# Patient Record
Sex: MALE | Race: WHITE | NOT HISPANIC OR LATINO | URBAN - METROPOLITAN AREA
[De-identification: names, ages, dates, MRNs, and addresses within clinical notes are randomized per-mention and may not be internally consistent; named-entity substitution may affect disease eponyms.]

---

## 2017-10-25 ENCOUNTER — EMERGENCY (EMERGENCY)
Facility: HOSPITAL | Age: 72
LOS: 0 days | Discharge: HOME | End: 2017-10-25

## 2017-10-25 DIAGNOSIS — R10.9 UNSPECIFIED ABDOMINAL PAIN: ICD-10-CM

## 2017-10-25 DIAGNOSIS — Z79.899 OTHER LONG TERM (CURRENT) DRUG THERAPY: ICD-10-CM

## 2017-10-25 DIAGNOSIS — R63.8 OTHER SYMPTOMS AND SIGNS CONCERNING FOOD AND FLUID INTAKE: ICD-10-CM

## 2017-10-25 DIAGNOSIS — Z87.442 PERSONAL HISTORY OF URINARY CALCULI: ICD-10-CM

## 2017-10-25 DIAGNOSIS — E78.00 PURE HYPERCHOLESTEROLEMIA, UNSPECIFIED: ICD-10-CM

## 2017-10-25 DIAGNOSIS — F32.9 MAJOR DEPRESSIVE DISORDER, SINGLE EPISODE, UNSPECIFIED: ICD-10-CM

## 2017-10-25 DIAGNOSIS — I10 ESSENTIAL (PRIMARY) HYPERTENSION: ICD-10-CM

## 2019-05-01 PROBLEM — Z00.00 ENCOUNTER FOR PREVENTIVE HEALTH EXAMINATION: Status: ACTIVE | Noted: 2019-05-01

## 2019-05-06 ENCOUNTER — APPOINTMENT (OUTPATIENT)
Dept: SURGERY | Facility: CLINIC | Age: 74
End: 2019-05-06
Payer: MEDICARE

## 2019-05-06 VITALS
DIASTOLIC BLOOD PRESSURE: 84 MMHG | BODY MASS INDEX: 28.17 KG/M2 | SYSTOLIC BLOOD PRESSURE: 142 MMHG | WEIGHT: 208 LBS | HEIGHT: 72 IN

## 2019-05-06 DIAGNOSIS — Z78.9 OTHER SPECIFIED HEALTH STATUS: ICD-10-CM

## 2019-05-06 PROCEDURE — 99202 OFFICE O/P NEW SF 15 MIN: CPT

## 2019-05-07 PROBLEM — Z78.9 CAFFEINE USE: Status: ACTIVE | Noted: 2019-05-06

## 2019-05-07 PROBLEM — Z78.9 SOCIAL ALCOHOL USE: Status: ACTIVE | Noted: 2019-05-06

## 2019-05-07 RX ORDER — OLMESARTAN MEDOXOMIL 40 MG/1
TABLET, FILM COATED ORAL
Refills: 0 | Status: ACTIVE | COMMUNITY

## 2019-05-07 NOTE — HISTORY OF PRESENT ILLNESS
[de-identified] : The patient presents for excision of a cyst in his right lower leg, present for at least 6 months and slowly enlarging. It is asymptomatic and there is no history of local trauma or infection.

## 2019-05-07 NOTE — PHYSICAL EXAM
[Normal Thyroid] : the thyroid was normal [Normal Breath Sounds] : Normal breath sounds [Normal Heart Sounds] : normal heart sounds [Normal Rate and Rhythm] : normal rate and rhythm [de-identified] : One CM raised intradermal lesion on the medial aspect of the right pretibial area, somewhat cystic in character but without any drainage or local acute changes. This is not tender. [de-identified] : no adenopathy

## 2019-05-09 ENCOUNTER — FORM ENCOUNTER (OUTPATIENT)
Age: 74
End: 2019-05-09

## 2019-05-10 ENCOUNTER — OUTPATIENT (OUTPATIENT)
Dept: OUTPATIENT SERVICES | Facility: HOSPITAL | Age: 74
LOS: 1 days | Discharge: HOME | End: 2019-05-10
Payer: MEDICARE

## 2019-05-10 VITALS
RESPIRATION RATE: 15 BRPM | DIASTOLIC BLOOD PRESSURE: 83 MMHG | TEMPERATURE: 98 F | HEART RATE: 88 BPM | WEIGHT: 212.53 LBS | SYSTOLIC BLOOD PRESSURE: 158 MMHG | OXYGEN SATURATION: 100 % | HEIGHT: 73 IN

## 2019-05-10 DIAGNOSIS — Z01.818 ENCOUNTER FOR OTHER PREPROCEDURAL EXAMINATION: ICD-10-CM

## 2019-05-10 DIAGNOSIS — L72.0 EPIDERMAL CYST: ICD-10-CM

## 2019-05-10 DIAGNOSIS — Z98.890 OTHER SPECIFIED POSTPROCEDURAL STATES: Chronic | ICD-10-CM

## 2019-05-10 LAB
ALBUMIN SERPL ELPH-MCNC: 4.3 G/DL — SIGNIFICANT CHANGE UP (ref 3.5–5.2)
ALP SERPL-CCNC: 77 U/L — SIGNIFICANT CHANGE UP (ref 30–115)
ALT FLD-CCNC: 36 U/L — SIGNIFICANT CHANGE UP (ref 0–41)
ANION GAP SERPL CALC-SCNC: 10 MMOL/L — SIGNIFICANT CHANGE UP (ref 7–14)
APTT BLD: 28.5 SEC — SIGNIFICANT CHANGE UP (ref 27–39.2)
AST SERPL-CCNC: 33 U/L — SIGNIFICANT CHANGE UP (ref 0–41)
BILIRUB SERPL-MCNC: 0.4 MG/DL — SIGNIFICANT CHANGE UP (ref 0.2–1.2)
BUN SERPL-MCNC: 30 MG/DL — HIGH (ref 10–20)
CALCIUM SERPL-MCNC: 9.3 MG/DL — SIGNIFICANT CHANGE UP (ref 8.5–10.1)
CHLORIDE SERPL-SCNC: 105 MMOL/L — SIGNIFICANT CHANGE UP (ref 98–110)
CO2 SERPL-SCNC: 26 MMOL/L — SIGNIFICANT CHANGE UP (ref 17–32)
CREAT SERPL-MCNC: 1.2 MG/DL — SIGNIFICANT CHANGE UP (ref 0.7–1.5)
GLUCOSE SERPL-MCNC: 93 MG/DL — SIGNIFICANT CHANGE UP (ref 70–99)
HCT VFR BLD CALC: 48.6 % — SIGNIFICANT CHANGE UP (ref 42–52)
HGB BLD-MCNC: 16.1 G/DL — SIGNIFICANT CHANGE UP (ref 14–18)
INR BLD: 0.98 RATIO — SIGNIFICANT CHANGE UP (ref 0.65–1.3)
MCHC RBC-ENTMCNC: 31.5 PG — HIGH (ref 27–31)
MCHC RBC-ENTMCNC: 33.1 G/DL — SIGNIFICANT CHANGE UP (ref 32–37)
MCV RBC AUTO: 95.1 FL — HIGH (ref 80–94)
NRBC # BLD: 0 /100 WBCS — SIGNIFICANT CHANGE UP (ref 0–0)
PLATELET # BLD AUTO: 154 K/UL — SIGNIFICANT CHANGE UP (ref 130–400)
POTASSIUM SERPL-MCNC: 4.6 MMOL/L — SIGNIFICANT CHANGE UP (ref 3.5–5)
POTASSIUM SERPL-SCNC: 4.6 MMOL/L — SIGNIFICANT CHANGE UP (ref 3.5–5)
PROT SERPL-MCNC: 6.9 G/DL — SIGNIFICANT CHANGE UP (ref 6–8)
PROTHROM AB SERPL-ACNC: 11.3 SEC — SIGNIFICANT CHANGE UP (ref 9.95–12.87)
RBC # BLD: 5.11 M/UL — SIGNIFICANT CHANGE UP (ref 4.7–6.1)
RBC # FLD: 13.9 % — SIGNIFICANT CHANGE UP (ref 11.5–14.5)
SODIUM SERPL-SCNC: 141 MMOL/L — SIGNIFICANT CHANGE UP (ref 135–146)
WBC # BLD: 6.28 K/UL — SIGNIFICANT CHANGE UP (ref 4.8–10.8)
WBC # FLD AUTO: 6.28 K/UL — SIGNIFICANT CHANGE UP (ref 4.8–10.8)

## 2019-05-10 PROCEDURE — 93010 ELECTROCARDIOGRAM REPORT: CPT

## 2019-05-10 PROCEDURE — 71046 X-RAY EXAM CHEST 2 VIEWS: CPT | Mod: 26

## 2019-05-10 NOTE — H&P PST ADULT - HISTORY OF PRESENT ILLNESS
73 year old male here for right leg cyst removal had for about 1 year and it has been growing  fos-4  denies chest pain sob palp  denies recent uri or uti

## 2019-05-10 NOTE — H&P PST ADULT - NSICDXPASTMEDICALHX_GEN_ALL_CORE_FT
PAST MEDICAL HISTORY:  BPH (benign prostatic hyperplasia)     Depression     High cholesterol     History of tonsillectomy     HTN (hypertension)     Kidney stones

## 2019-05-10 NOTE — H&P PST ADULT - NSANTHOSAYNRD_GEN_A_CORE
No. LORRIE screening performed.  STOP BANG Legend: 0-2 = LOW Risk; 3-4 = INTERMEDIATE Risk; 5-8 = HIGH Risk/denies see lorrie tool

## 2019-05-16 NOTE — ASU PATIENT PROFILE, ADULT - PMH
BPH (benign prostatic hyperplasia)    Depression    High cholesterol    History of tonsillectomy    HTN (hypertension)    Kidney stones

## 2019-05-17 ENCOUNTER — OUTPATIENT (OUTPATIENT)
Dept: OUTPATIENT SERVICES | Facility: HOSPITAL | Age: 74
LOS: 1 days | Discharge: HOME | End: 2019-05-17
Payer: MEDICARE

## 2019-05-17 ENCOUNTER — APPOINTMENT (OUTPATIENT)
Dept: SURGERY | Facility: HOSPITAL | Age: 74
End: 2019-05-17

## 2019-05-17 ENCOUNTER — RESULT REVIEW (OUTPATIENT)
Age: 74
End: 2019-05-17

## 2019-05-17 VITALS — HEART RATE: 70 BPM | DIASTOLIC BLOOD PRESSURE: 72 MMHG | RESPIRATION RATE: 18 BRPM | SYSTOLIC BLOOD PRESSURE: 121 MMHG

## 2019-05-17 VITALS
WEIGHT: 210.1 LBS | RESPIRATION RATE: 17 BRPM | SYSTOLIC BLOOD PRESSURE: 148 MMHG | HEART RATE: 70 BPM | TEMPERATURE: 97 F | OXYGEN SATURATION: 96 % | DIASTOLIC BLOOD PRESSURE: 94 MMHG | HEIGHT: 74 IN

## 2019-05-17 DIAGNOSIS — Z98.890 OTHER SPECIFIED POSTPROCEDURAL STATES: Chronic | ICD-10-CM

## 2019-05-17 PROCEDURE — 88304 TISSUE EXAM BY PATHOLOGIST: CPT | Mod: 26

## 2019-05-17 PROCEDURE — 12032 INTMD RPR S/A/T/EXT 2.6-7.5: CPT

## 2019-05-17 PROCEDURE — 11402 EXC TR-EXT B9+MARG 1.1-2 CM: CPT

## 2019-05-17 RX ORDER — HYDROMORPHONE HYDROCHLORIDE 2 MG/ML
0.5 INJECTION INTRAMUSCULAR; INTRAVENOUS; SUBCUTANEOUS
Refills: 0 | Status: DISCONTINUED | OUTPATIENT
Start: 2019-05-17 | End: 2019-05-20

## 2019-05-17 RX ORDER — ONDANSETRON 8 MG/1
4 TABLET, FILM COATED ORAL ONCE
Refills: 0 | Status: DISCONTINUED | OUTPATIENT
Start: 2019-05-17 | End: 2019-05-20

## 2019-05-17 RX ORDER — SODIUM CHLORIDE 9 MG/ML
1000 INJECTION, SOLUTION INTRAVENOUS
Refills: 0 | Status: DISCONTINUED | OUTPATIENT
Start: 2019-05-17 | End: 2019-05-20

## 2019-05-17 RX ORDER — DOCUSATE SODIUM 100 MG
1 CAPSULE ORAL
Qty: 30 | Refills: 0
Start: 2019-05-17

## 2019-05-17 RX ORDER — ACETAMINOPHEN WITH CODEINE 300MG-30MG
1 TABLET ORAL
Qty: 12 | Refills: 0
Start: 2019-05-17

## 2019-05-17 RX ADMIN — SODIUM CHLORIDE 100 MILLILITER(S): 9 INJECTION, SOLUTION INTRAVENOUS at 13:30

## 2019-05-17 NOTE — ASU DISCHARGE PLAN (ADULT/PEDIATRIC) - CALL YOUR DOCTOR IF YOU HAVE ANY OF THE FOLLOWING:
Wound/Surgical Site with redness, or foul smelling discharge or pus/Unable to urinate/Pain not relieved by Medications/Bleeding that does not stop/Inability to tolerate liquids or foods/Fever greater than (need to indicate Fahrenheit or Celsius)/Swelling that gets worse

## 2019-05-17 NOTE — BRIEF OPERATIVE NOTE - OPERATION/FINDINGS
Right lower extremity anterior low leg lesion excised in its entirety, 3cm incision, closed with sutures. Hemostasis achieved

## 2019-05-17 NOTE — ASU DISCHARGE PLAN (ADULT/PEDIATRIC) - CARE PROVIDER_API CALL
Juanjo Jackson (MD)  Surgery  [address] [place] [address], [address] [place] [address]  Phone: [phone]  Fax: [phone]  Follow Up Time:

## 2019-05-17 NOTE — BRIEF OPERATIVE NOTE - NSICDXBRIEFPROCEDURE_GEN_ALL_CORE_FT
PROCEDURES:  Excision of lesion of right lower leg 17-May-2019 13:24:02 anterior lower leg Antoinette Smith

## 2019-05-17 NOTE — BRIEF OPERATIVE NOTE - NSICDXBRIEFPREOP_GEN_ALL_CORE_FT
PRE-OP DIAGNOSIS:  Skin lesion 17-May-2019 13:24:21 suspected epidermal inclusion cyst vs dermatofibroma Antoinette Smith

## 2019-05-17 NOTE — ASU DISCHARGE PLAN (ADULT/PEDIATRIC) - ASU DC SPECIAL INSTRUCTIONSFT
You may shower starting in 48 hours- let the soapy water run over your dressings which should be waterproof.  You should remove your outer dressing of clear sticky tape and gauze in 48 hours. You will see there are white tape strips underneath the gauze and these will stay on until they fall off by themselves in 1-2 weeks- do not pull them.  You may pat dry, do not rub.  No tubs or soaking.     You may eat today.   Avoid trauma to the area and avoid prolonged standing and walking. Keep your right leg elevated. You should expect some swelling.     Follow up with Dr. Jackson as scheduled.   Call the office with any questions or concerns and if they are not available, please seek medical attention as needed.       You can restart all your medications.   To control your pain, take acetaminophen (Tylenol) as directed on the bottle. Take this on a schedule, and as your pain gets better start taking it less often until you no longer need them. A prescription was called in to your pharmacy for a pain medication called Tylenol #3. It is a combination of acetaminophen (Tylenol) and codeine. You should take this as needed for pain not controlled by Tylenol alone.  You cannot drive while taking this medication.  Follow the directions on the bottle and given to you by your pharmacist. You may also take a stool softener like Colace (docusate) while you are taking this pain medicine as it may cause constipation.   You can switch to taking only Tylenol or other over the counter pain medications as your pain improves.  Make sure the total amount of acetaminophen (Tylenol) you take is less than 3500mg total a day- remember to add up the amount in the Tylenol #3 and any Tylenol you may take.

## 2019-05-17 NOTE — BRIEF OPERATIVE NOTE - NSICDXBRIEFPOSTOP_GEN_ALL_CORE_FT
POST-OP DIAGNOSIS:  Skin lesion 17-May-2019 13:24:42 suspected epidermal inclusion cyst vs dermatofibroma Antoinette Smith

## 2019-05-20 LAB — SURGICAL PATHOLOGY STUDY: SIGNIFICANT CHANGE UP

## 2019-05-21 PROBLEM — E78.00 PURE HYPERCHOLESTEROLEMIA, UNSPECIFIED: Chronic | Status: ACTIVE | Noted: 2019-05-10

## 2019-05-21 PROBLEM — F32.9 MAJOR DEPRESSIVE DISORDER, SINGLE EPISODE, UNSPECIFIED: Chronic | Status: ACTIVE | Noted: 2019-05-10

## 2019-05-21 PROBLEM — N20.0 CALCULUS OF KIDNEY: Chronic | Status: ACTIVE | Noted: 2019-05-10

## 2019-05-21 PROBLEM — N40.0 BENIGN PROSTATIC HYPERPLASIA WITHOUT LOWER URINARY TRACT SYMPTOMS: Chronic | Status: ACTIVE | Noted: 2019-05-10

## 2019-05-21 PROBLEM — I10 ESSENTIAL (PRIMARY) HYPERTENSION: Chronic | Status: ACTIVE | Noted: 2019-05-10

## 2019-05-21 PROBLEM — Z90.89 ACQUIRED ABSENCE OF OTHER ORGANS: Chronic | Status: ACTIVE | Noted: 2019-05-10

## 2019-05-22 DIAGNOSIS — L72.0 EPIDERMAL CYST: ICD-10-CM

## 2019-05-22 DIAGNOSIS — E78.00 PURE HYPERCHOLESTEROLEMIA, UNSPECIFIED: ICD-10-CM

## 2019-05-22 DIAGNOSIS — I10 ESSENTIAL (PRIMARY) HYPERTENSION: ICD-10-CM

## 2019-05-30 ENCOUNTER — APPOINTMENT (OUTPATIENT)
Dept: SURGERY | Facility: CLINIC | Age: 74
End: 2019-05-30
Payer: MEDICARE

## 2019-05-30 ENCOUNTER — OTHER (OUTPATIENT)
Age: 74
End: 2019-05-30

## 2019-05-30 VITALS
WEIGHT: 210 LBS | DIASTOLIC BLOOD PRESSURE: 78 MMHG | HEIGHT: 72 IN | BODY MASS INDEX: 28.44 KG/M2 | SYSTOLIC BLOOD PRESSURE: 126 MMHG

## 2019-05-30 DIAGNOSIS — L72.0 EPIDERMAL CYST: ICD-10-CM

## 2019-05-30 PROCEDURE — 99024 POSTOP FOLLOW-UP VISIT: CPT

## 2019-05-30 RX ORDER — TAMSULOSIN HYDROCHLORIDE 0.4 MG/1
0.4 CAPSULE ORAL
Qty: 90 | Refills: 0 | Status: ACTIVE | COMMUNITY
Start: 2018-10-08

## 2019-05-30 RX ORDER — POTASSIUM CITRATE 10 MEQ/1
10 MEQ TABLET, EXTENDED RELEASE ORAL
Qty: 200 | Refills: 0 | Status: ACTIVE | COMMUNITY
Start: 2018-08-11

## 2019-05-30 RX ORDER — ROSUVASTATIN CALCIUM 10 MG/1
10 TABLET, FILM COATED ORAL
Qty: 90 | Refills: 0 | Status: ACTIVE | COMMUNITY
Start: 2018-02-20

## 2019-05-30 RX ORDER — VILAZODONE HYDROCHLORIDE 40 MG/1
40 TABLET ORAL
Qty: 5 | Refills: 0 | Status: ACTIVE | COMMUNITY
Start: 2019-05-12

## 2019-05-30 RX ORDER — AMLODIPINE BESYLATE 5 MG/1
5 TABLET ORAL
Qty: 30 | Refills: 0 | Status: ACTIVE | COMMUNITY
Start: 2019-05-13

## 2019-05-30 RX ORDER — FINASTERIDE 5 MG/1
5 TABLET, FILM COATED ORAL
Qty: 90 | Refills: 0 | Status: ACTIVE | COMMUNITY
Start: 2018-06-04

## 2019-05-30 NOTE — ASSESSMENT
[FreeTextEntry1] : No p.o. problems noted.\par Local care and activity reviewed, no further Rx needed.

## 2020-05-22 ENCOUNTER — OUTPATIENT (OUTPATIENT)
Dept: OUTPATIENT SERVICES | Facility: HOSPITAL | Age: 75
LOS: 1 days | Discharge: HOME | End: 2020-05-22
Payer: MEDICARE

## 2020-05-22 DIAGNOSIS — M79.662 PAIN IN LEFT LOWER LEG: ICD-10-CM

## 2020-05-22 DIAGNOSIS — Z98.890 OTHER SPECIFIED POSTPROCEDURAL STATES: Chronic | ICD-10-CM

## 2020-05-22 PROCEDURE — 73718 MRI LOWER EXTREMITY W/O DYE: CPT | Mod: 26,LT

## 2020-09-16 ENCOUNTER — APPOINTMENT (OUTPATIENT)
Dept: ORTHOPEDIC SURGERY | Facility: CLINIC | Age: 75
End: 2020-09-16
Payer: MEDICARE

## 2020-09-16 VITALS — TEMPERATURE: 98 F

## 2020-09-16 DIAGNOSIS — M25.551 PAIN IN RIGHT HIP: ICD-10-CM

## 2020-09-16 DIAGNOSIS — M25.561 PAIN IN RIGHT KNEE: ICD-10-CM

## 2020-09-16 PROCEDURE — 73562 X-RAY EXAM OF KNEE 3: CPT | Mod: RT

## 2020-09-16 PROCEDURE — 99203 OFFICE O/P NEW LOW 30 MIN: CPT

## 2020-09-16 PROCEDURE — 73502 X-RAY EXAM HIP UNI 2-3 VIEWS: CPT | Mod: RT

## 2020-09-16 RX ORDER — ACETAMINOPHEN AND CODEINE 300; 30 MG/1; MG/1
300-30 TABLET ORAL
Qty: 12 | Refills: 0 | Status: DISCONTINUED | COMMUNITY
Start: 2019-05-17 | End: 2020-09-16

## 2020-09-16 RX ORDER — AZITHROMYCIN 250 MG/1
250 TABLET, FILM COATED ORAL
Qty: 6 | Refills: 0 | Status: DISCONTINUED | COMMUNITY
Start: 2019-01-02 | End: 2020-09-16

## 2020-09-16 RX ORDER — BENZOCAINE 20 G/100G
100 GEL, DENTIFRICE ORAL
Qty: 30 | Refills: 0 | Status: DISCONTINUED | COMMUNITY
Start: 2019-05-17 | End: 2020-09-16

## 2020-09-16 RX ORDER — DICLOFENAC SODIUM 75 MG/1
75 TABLET, DELAYED RELEASE ORAL
Qty: 14 | Refills: 0 | Status: DISCONTINUED | COMMUNITY
Start: 2019-05-13 | End: 2020-09-16

## 2020-09-16 RX ORDER — GUAIFENESIN AND CODEINE PHOSPHATE 10; 100 MG/5ML; MG/5ML
100-10 LIQUID ORAL
Qty: 150 | Refills: 0 | Status: DISCONTINUED | COMMUNITY
Start: 2019-01-02 | End: 2020-09-16

## 2020-09-16 RX ORDER — OFLOXACIN 3 MG/ML
0.3 SOLUTION/ DROPS OPHTHALMIC
Qty: 5 | Refills: 0 | Status: DISCONTINUED | COMMUNITY
Start: 2019-01-02 | End: 2020-09-16

## 2020-09-16 NOTE — PHYSICAL EXAM
[de-identified] : General appearance: well nourished and hydrated, pleasant, alert and oriented x 3, cooperative.\par Cardiovascular: no apparent abnormalities, no lower leg edema, no varicosities, pedal pulses are palpable.\par Neurologic: sensation is normal, no muscle weakness in upper or lower extremities\par Dermatologic no apparent skin lesions, moist, warm, no rash.\par Gait: nonantalgic.\par \par Left knee\par Inspection: no effusion or erythema.\par Wounds: none.\par Alignment: normal.\par Palpation: no specific tenderness on palpation.\par ROM: 0-125 degrees \par Ligamentous laxity: all ligaments appear stable\par Muscle Test: good quad strength.\par \par Right knee\par Inspection: small effusion or erythema.\par Wounds: none.\par Alignment: slightly valgus\par Palpation: lateral joint line tenderness \par ROM: 0-125 degrees \par Ligamentous laxity: all ligaments appear stable\par Muscle Test: good quad strength.\par \par Left hip\par Inspection: No swelling or ecchymosis.\par Wounds: none.\par Palpation: non-tender.\par Stability: no instability.\par Strength: 5/5 all motor groups.\par ROM: no pain with FROM.\par Leg length: equal.\par \par Right hip\par Inspection: No swelling or ecchymosis.\par Wounds: none.\par Palpation: non-tender.\par Stability: no instability.\par Strength: 5/5 all motor groups.\par ROM: no pain with FROM.\par Leg length: equal.\par \par   [de-identified] : Radiographs done today AP lateral and skyline of the right knee shows mild lateral joint space narrowing in the right knee. Well maintained joint spaces in the left knee. \par \par Radiographs done today AP pelvis and lateral of the right hip shows well maintained joint spaces

## 2020-09-16 NOTE — HISTORY OF PRESENT ILLNESS
[Pain Location] : pain [] : right knee [Worsening] : worsening [Standing] : standing [Walking] : walking [5] : a maximum pain level of 5/10 [Daily] : ~He/She~ states the symptoms seem to be occuring daily [de-identified] : 74 year old male presents to the office today complaining of right knee pain x2-3 months. Patient reports pain that is sharp in nature. He reports an overall feeling of weakness in the right lower extremity. Patient reports buckling and a loss of motion. He reports ambulating about 5 miles 4x per week and states that is him doing less than usual due to the pain he has been feeling. Patient reports pain and difficulty with negotiating stairs. Patient reports pain with putting socks and shoes on on his right side. Patient reports taking Advil for pain with good relief. He has an arthroscopy for a torn meniscus but is unsure which knee. Patient is here today to discuss his options for pain relief.

## 2020-09-16 NOTE — ASSESSMENT
[FreeTextEntry1] : 74 year old male presents for 2-3 month history of right knee pain. He describes a feeling of weakness in the right lower extremity. His pain level is a 5/10. He is able to walk 5 miles, 4x a weak. He has pain and difficulty on stairs, difficulty putting on shoes and socks. He had an arthroscopy for a meniscal tear in the past, but is unsure which knee he had it on. He takes Advil with good relief. Radiographs show lateral joint space narrowing in the right knee. Plan is to order an MRI of the right knee to rule out a torn lateral meniscus.

## 2020-09-17 ENCOUNTER — RESULT REVIEW (OUTPATIENT)
Age: 75
End: 2020-09-17

## 2020-09-17 ENCOUNTER — OUTPATIENT (OUTPATIENT)
Dept: OUTPATIENT SERVICES | Facility: HOSPITAL | Age: 75
LOS: 1 days | Discharge: HOME | End: 2020-09-17

## 2020-09-17 DIAGNOSIS — Z98.890 OTHER SPECIFIED POSTPROCEDURAL STATES: Chronic | ICD-10-CM

## 2020-09-17 PROCEDURE — 73721 MRI JNT OF LWR EXTRE W/O DYE: CPT | Mod: 26,RT

## 2020-09-30 ENCOUNTER — APPOINTMENT (OUTPATIENT)
Dept: ORTHOPEDIC SURGERY | Facility: CLINIC | Age: 75
End: 2020-09-30
Payer: MEDICARE

## 2020-09-30 VITALS — TEMPERATURE: 98 F

## 2020-09-30 PROCEDURE — 99214 OFFICE O/P EST MOD 30 MIN: CPT

## 2020-09-30 NOTE — PHYSICAL EXAM
[de-identified] : General appearance: well nourished and hydrated, pleasant, alert and oriented x 3, cooperative.\par Cardiovascular: no apparent abnormalities, no lower leg edema, no varicosities, pedal pulses are palpable.\par Neurologic: sensation is normal, no muscle weakness in upper or lower extremities\par Dermatologic no apparent skin lesions, moist, warm, no rash.\par Gait: nonantalgic.\par \par Left knee\par Inspection: no effusion or erythema.\par Wounds: none.\par Alignment: normal.\par Palpation: no specific tenderness on palpation.\par ROM: 0-125 degrees \par Ligamentous laxity: all ligaments appear stable\par Muscle Test: good quad strength.\par \par Right knee\par Inspection: small effusion or erythema.\par Wounds: none.\par Alignment: slightly valgus\par Palpation: lateral joint line tenderness \par ROM: 0-125 degrees \par Ligamentous laxity: all ligaments appear stable\par Muscle Test: good quad strength.\par \par Left hip\par Inspection: No swelling or ecchymosis.\par Wounds: none.\par Palpation: non-tender.\par Stability: no instability.\par Strength: 5/5 all motor groups.\par ROM: no pain with FROM.\par Leg length: equal.\par \par Right hip\par Inspection: No swelling or ecchymosis.\par Wounds: none.\par Palpation: non-tender.\par Stability: no instability.\par Strength: 5/5 all motor groups.\par ROM: no pain with FROM.\par Leg length: equal.\par \par   [de-identified] : Radiographs done today AP lateral and skyline of the right knee shows mild lateral joint space narrowing in the right knee. Well maintained joint spaces in the left knee. \par \par Radiographs done today AP pelvis and lateral of the right hip shows well maintained joint spaces

## 2020-09-30 NOTE — ASSESSMENT
[FreeTextEntry1] : Pt is here to discuss the results of his right knee MRI from Sept 17, 2020. 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 Pt was given three options, 1. partial knee replacement, 2. arthroscopy, 3. NSAIDs and gel injections. Pt would like to consider these options and get back to us.

## 2020-09-30 NOTE — HISTORY OF PRESENT ILLNESS
[Pain Location] : pain [] : right knee [Worsening] : worsening [5] : a maximum pain level of 5/10 [Walking] : walking [Standing] : standing [Daily] : ~He/She~ states the symptoms seem to be occuring daily [de-identified] : 9/16/2020 - 74 year old male presents to the office today complaining of right knee pain x2-3 months. Patient reports pain that is sharp in nature. He reports an overall feeling of weakness in the right lower extremity. Patient reports buckling and a loss of motion. He reports ambulating about 5 miles 4x per week and states that is him doing less than usual due to the pain he has been feeling. Patient reports pain and difficulty with negotiating stairs. Patient reports pain with putting socks and shoes on on his right side. Patient reports taking Advil for pain with good relief. He has an arthroscopy for a torn meniscus but is unsure which knee. Patient is here today to discuss his options for pain relief.\par \par 9/30/2020 - 74 year old male presents to the office today to review his MRI results. Patient continues to complain of pain in the right knee. Pain is at its worst with standing from a seated position and ascending steps.

## 2020-10-01 RX ORDER — HYALURONATE SOD, CROSS-LINKED 30 MG/3 ML
30 SYRINGE (ML) INTRAARTICULAR
Qty: 1 | Refills: 0 | Status: ACTIVE | COMMUNITY
Start: 2020-10-01 | End: 1900-01-01

## 2020-10-12 ENCOUNTER — APPOINTMENT (OUTPATIENT)
Dept: ORTHOPEDIC SURGERY | Facility: CLINIC | Age: 75
End: 2020-10-12
Payer: MEDICARE

## 2020-10-12 DIAGNOSIS — M17.11 UNILATERAL PRIMARY OSTEOARTHRITIS, RIGHT KNEE: ICD-10-CM

## 2020-10-12 PROCEDURE — 99213 OFFICE O/P EST LOW 20 MIN: CPT | Mod: 25

## 2020-10-12 PROCEDURE — 20610 DRAIN/INJ JOINT/BURSA W/O US: CPT | Mod: RT

## 2020-10-12 RX ORDER — HYALURONATE SOD, CROSS-LINKED 30 MG/3 ML
30 SYRINGE (ML) INTRAARTICULAR
Refills: 0 | Status: COMPLETED | OUTPATIENT
Start: 2020-10-12

## 2020-10-12 RX ADMIN — Medication 0 MG/3ML: at 00:00

## 2020-10-12 NOTE — ASSESSMENT
[FreeTextEntry1] : Discussed at length with the patient the planned Gel 1 injection. The risks, benefits, convalescence and alternatives were reviewed. The possible side effects discussed included but were not limited to: pain, swelling, heat and redness. There symptoms are generally mild but if they are extensive then contact the office. Giving pain relievers by mouth such as NSAID’s or Tylenol can generally treat the reactions to steroid and lidocaine. Rare cases of infection have been noted. Rash, hives and itching may occur post injection. If you have muscle pain or cramps, flushing and or swelling of the face, rapid heart beat, nausea, dizziness, fever, chills, headache, difficulty breathing, swelling in the arms or legs, or have a prickly feeling of your skin, contact a health care provider immediately.\par \par Following this discussion, the right knee was prepped with betadine and under sterile conditions the Gel 1 injection was performed with a 22 gauge needle. The needle was introduced into the joint, aspiration was performed to ensure intra-articular placement and the medication was injected. Upon withdrawal of the needle the site was cleaned with alcohol and a bandaid applied. The patient tolerated the injection well and there were no adverse effects. Post injection instructions included no strenuous activity for 24 hours, cryotherapy and if there are any adverse effects to contact the office.

## 2020-11-02 ENCOUNTER — APPOINTMENT (OUTPATIENT)
Dept: UROLOGY | Facility: CLINIC | Age: 75
End: 2020-11-02
Payer: MEDICARE

## 2020-11-02 ENCOUNTER — TRANSCRIPTION ENCOUNTER (OUTPATIENT)
Age: 75
End: 2020-11-02

## 2020-11-02 VITALS
WEIGHT: 205 LBS | HEIGHT: 72 IN | DIASTOLIC BLOOD PRESSURE: 100 MMHG | BODY MASS INDEX: 27.77 KG/M2 | SYSTOLIC BLOOD PRESSURE: 139 MMHG | HEART RATE: 68 BPM | TEMPERATURE: 97.9 F

## 2020-11-02 DIAGNOSIS — N52.9 MALE ERECTILE DYSFUNCTION, UNSPECIFIED: ICD-10-CM

## 2020-11-02 DIAGNOSIS — R35.0 FREQUENCY OF MICTURITION: ICD-10-CM

## 2020-11-02 DIAGNOSIS — R35.1 NOCTURIA: ICD-10-CM

## 2020-11-02 DIAGNOSIS — R39.15 URGENCY OF URINATION: ICD-10-CM

## 2020-11-02 DIAGNOSIS — K40.20 BILATERAL INGUINAL HERNIA, W/OUT OBSTRUCTION OR GANGRENE, NOT SPECIFIED AS RECURRENT: ICD-10-CM

## 2020-11-02 DIAGNOSIS — R30.0 DYSURIA: ICD-10-CM

## 2020-11-02 DIAGNOSIS — R33.9 RETENTION OF URINE, UNSPECIFIED: ICD-10-CM

## 2020-11-02 DIAGNOSIS — R39.13 SPLITTING OF URINARY STREAM: ICD-10-CM

## 2020-11-02 PROCEDURE — 99204 OFFICE O/P NEW MOD 45 MIN: CPT

## 2020-11-02 RX ORDER — HYDROCODONE BITARTRATE AND ACETAMINOPHEN 7.5; 325 MG/1; MG/1
7.5-325 TABLET ORAL
Qty: 18 | Refills: 0 | Status: COMPLETED | COMMUNITY
Start: 2020-08-20

## 2020-11-02 RX ORDER — CLOTRIMAZOLE AND BETAMETHASONE DIPROPIONATE 10; .5 MG/G; MG/G
1-0.05 CREAM TOPICAL
Refills: 0 | Status: ACTIVE | COMMUNITY

## 2020-11-02 RX ORDER — DICLOFENAC SODIUM 10 MG/G
1 GEL TOPICAL DAILY
Qty: 1 | Refills: 0 | Status: COMPLETED | COMMUNITY
Start: 2020-10-01 | End: 2020-11-02

## 2020-11-02 RX ORDER — ROSUVASTATIN CALCIUM 5 MG/1
TABLET, FILM COATED ORAL
Refills: 0 | Status: COMPLETED | COMMUNITY
End: 2020-11-02

## 2020-11-02 RX ORDER — AMOXICILLIN 500 MG/1
500 CAPSULE ORAL
Qty: 30 | Refills: 0 | Status: COMPLETED | COMMUNITY
Start: 2020-09-30

## 2020-11-02 RX ORDER — TIZANIDINE 4 MG/1
4 TABLET ORAL
Qty: 30 | Refills: 0 | Status: COMPLETED | COMMUNITY
Start: 2020-05-18

## 2020-11-02 NOTE — LETTER HEADER
[FreeTextEntry3] : Xavier Souza M.D.\par Director of Urology\par [facility]\par [address] [place] [address], [address]\[place] [address]

## 2020-11-02 NOTE — REVIEW OF SYSTEMS
[see HPI] : see HPI [Joint Pain] : joint pain [Joint Swelling] : joint swelling [Negative] : Heme/Lymph [FreeTextEntry1] : pins and needles left side

## 2020-11-02 NOTE — HISTORY OF PRESENT ILLNESS
[Urinary Urgency] : urinary urgency [Urinary Frequency] : urinary frequency [Nocturia] : nocturia [Straining] : straining [Weak Stream] : weak stream [Erectile Dysfunction] : Erectile Dysfunction [FreeTextEntry1] : Ray is a 74-year-old male with a long history of stones story back as far as 1981. The last time he was treated was by Dr. Brantley he thinks seven or eight years ago and is last CAT scan was in 2017 which showed bilateral stones on the left it was punctate on the right they were almost 5 mm. He hasn’t seen Dr. Brantley since then he has retired not to his liking but now he wants a second opinion a possible transfer of care.\par \par This desire to seek help is partially instigated by severe low urinary tract symptoms with the a UA symptoms score equal to 25/2/4.\par \par Sexually his penis does well enough and it is more than his wife wants with limited sexual activity very often self stimulated. When they are together he feels it suffices.  [Urinary Incontinence] : no urinary incontinence [Urinary Retention] : no urinary retention [Intermittency] : intermittency [Dysuria] : no dysuria [Hematuria - Gross] : no gross hematuria

## 2020-11-02 NOTE — LETTER BODY
[Dear  ___] : Dear  [unfilled], [Courtesy Letter:] : I had the pleasure of seeing your patient, [unfilled], in my office today. [Please see my note below.] : Please see my note below. [Consult Closing:] : Thank you very much for allowing me to participate in the care of this patient.  If you have any questions, please do not hesitate to contact me. [Sincerely,] : Sincerely, [FreeTextEntry2] : Princess Pardo MD\par [address]\[place] [address]\par

## 2020-11-02 NOTE — ASSESSMENT
[FreeTextEntry1] : He has several issues. Number one he has bilateral stones as far back as 2017 and hasn’t been check since then.\par \par Number two, he has severe low urinary tract symptoms and may be auto dehydrating which may be contributing to his stones\par \par Number three he has ED with penile atrophy and though he says it is good enough for what he is doing if we can make it better he would not object.\par \par Number four finally I found bilateral hernias left greater than right with the left being slightly tender when I pushed it back in and is something I think for which he should see a general surgeon with expertise in this. \par

## 2020-11-02 NOTE — PHYSICAL EXAM
[General Appearance - Well Developed] : well developed [General Appearance - Well Nourished] : well nourished [Normal Appearance] : normal appearance [Well Groomed] : well groomed [General Appearance - In No Acute Distress] : no acute distress [Heart Rate And Rhythm] : Heart rate and rhythm were normal [Edema] : no peripheral edema [Respiration, Rhythm And Depth] : normal respiratory rhythm and effort [Exaggerated Use Of Accessory Muscles For Inspiration] : no accessory muscle use [Auscultation Breath Sounds / Voice Sounds] : lungs were clear to auscultation bilaterally [Abdomen Soft] : soft [Abdomen Tenderness] : non-tender [Costovertebral Angle Tenderness] : no ~M costovertebral angle tenderness [Penis Abnormality] : normal circumcised penis [Testes Tenderness] : no tenderness of the testes [Testes Mass (___cm)] : there were no testicular masses [Anus Abnormality] : the anus and perineum were normal [Prostate Tenderness] : the prostate was not tender [No Prostate Nodules] : no prostate nodules [Prostate Size ___ gm] : prostate size [unfilled] gm [Normal Station and Gait] : the gait and station were normal for the patient's age [] : no rash [Oriented To Time, Place, And Person] : oriented to person, place, and time [Affect] : the affect was normal [Mood] : the mood was normal [Not Anxious] : not anxious [Inguinal Lymph Nodes Enlarged Bilaterally] : inguinal [FreeTextEntry1] : DTR's & BC reflexes were intact

## 2020-11-05 ENCOUNTER — OUTPATIENT (OUTPATIENT)
Dept: OUTPATIENT SERVICES | Facility: HOSPITAL | Age: 75
LOS: 1 days | Discharge: HOME | End: 2020-11-05
Payer: MEDICARE

## 2020-11-05 ENCOUNTER — RESULT REVIEW (OUTPATIENT)
Age: 75
End: 2020-11-05

## 2020-11-05 DIAGNOSIS — N20.0 CALCULUS OF KIDNEY: ICD-10-CM

## 2020-11-05 DIAGNOSIS — K40.20 BILATERAL INGUINAL HERNIA, WITHOUT OBSTRUCTION OR GANGRENE, NOT SPECIFIED AS RECURRENT: ICD-10-CM

## 2020-11-05 DIAGNOSIS — R35.1 NOCTURIA: ICD-10-CM

## 2020-11-05 DIAGNOSIS — R33.9 RETENTION OF URINE, UNSPECIFIED: ICD-10-CM

## 2020-11-05 DIAGNOSIS — Z98.890 OTHER SPECIFIED POSTPROCEDURAL STATES: Chronic | ICD-10-CM

## 2020-11-05 LAB
ALBUMIN SERPL ELPH-MCNC: 4.4 G/DL
ALP BLD-CCNC: 105 U/L
ALT SERPL-CCNC: 22 U/L
APPEARANCE: CLEAR
AST SERPL-CCNC: 26 U/L
BACTERIA UR CULT: NORMAL
BASOPHILS # BLD AUTO: 0.04 K/UL
BASOPHILS NFR BLD AUTO: 0.6 %
BILIRUB DIRECT SERPL-MCNC: <0.2 MG/DL
BILIRUB INDIRECT SERPL-MCNC: >0.3 MG/DL
BILIRUB SERPL-MCNC: 0.5 MG/DL
BILIRUBIN URINE: NEGATIVE
BLOOD URINE: NEGATIVE
COLOR: YELLOW
EOSINOPHIL # BLD AUTO: 0.05 K/UL
EOSINOPHIL NFR BLD AUTO: 0.8 %
ESTRADIOL SERPL-MCNC: 38 PG/ML
GLUCOSE QUALITATIVE U: NEGATIVE
HCT VFR BLD CALC: 48.5 %
HGB BLD-MCNC: 15.5 G/DL
IMM GRANULOCYTES NFR BLD AUTO: 0.2 %
KETONES URINE: NEGATIVE
LEUKOCYTE ESTERASE URINE: NEGATIVE
LH SERPL-ACNC: 8 IU/L
LYMPHOCYTES # BLD AUTO: 1.54 K/UL
LYMPHOCYTES NFR BLD AUTO: 24.6 %
MAN DIFF?: NORMAL
MCHC RBC-ENTMCNC: 31.3 PG
MCHC RBC-ENTMCNC: 32 G/DL
MCV RBC AUTO: 97.8 FL
MONOCYTES # BLD AUTO: 0.49 K/UL
MONOCYTES NFR BLD AUTO: 7.8 %
NEUTROPHILS # BLD AUTO: 4.13 K/UL
NEUTROPHILS NFR BLD AUTO: 66 %
NITRITE URINE: NEGATIVE
PH URINE: 6.5
PLATELET # BLD AUTO: 210 K/UL
PROLACTIN SERPL-MCNC: 6.9 NG/ML
PROT SERPL-MCNC: 6.8 G/DL
PROTEIN URINE: NORMAL
PSA FREE FLD-MCNC: 24 %
PSA FREE SERPL-MCNC: 0.39 NG/ML
PSA SERPL-MCNC: 1.63 NG/ML
RBC # BLD: 4.96 M/UL
RBC # FLD: 13 %
SPECIFIC GRAVITY URINE: 1.03
URINE CYTOLOGY: NORMAL
UROBILINOGEN URINE: NORMAL
WBC # FLD AUTO: 6.26 K/UL

## 2020-11-05 PROCEDURE — 76770 US EXAM ABDO BACK WALL COMP: CPT | Mod: 26

## 2020-11-05 PROCEDURE — 74176 CT ABD & PELVIS W/O CONTRAST: CPT | Mod: 26

## 2020-11-10 LAB
SHBG SERPL-SCNC: 59 NMOL/L
TESTOST BND SERPL-MCNC: 12.2 NG/DL
TESTOSTERONE BIOAVAILABLE: 114 NG/DL
TESTOSTERONE TOTAL S: 760 NG/DL

## 2020-11-11 LAB
% FREE TESTOSTERONE - ESO: 1.4 %
FREE TESTOSTERONE - ESO: 106 PG/ML
SHBG-ESOTERIX: 64.3 NMOL/L
TESTOSTERONE SERUM - ESO: 759 NG/DL

## 2020-12-14 ENCOUNTER — APPOINTMENT (OUTPATIENT)
Dept: UROLOGY | Facility: CLINIC | Age: 75
End: 2020-12-14

## 2021-07-30 ENCOUNTER — OUTPATIENT (OUTPATIENT)
Dept: OUTPATIENT SERVICES | Facility: HOSPITAL | Age: 76
LOS: 1 days | Discharge: HOME | End: 2021-07-30
Payer: MEDICARE

## 2021-07-30 DIAGNOSIS — N20.0 CALCULUS OF KIDNEY: ICD-10-CM

## 2021-07-30 DIAGNOSIS — Z98.890 OTHER SPECIFIED POSTPROCEDURAL STATES: Chronic | ICD-10-CM

## 2021-07-30 PROCEDURE — 74019 RADEX ABDOMEN 2 VIEWS: CPT | Mod: 26

## 2021-08-29 NOTE — ASU PATIENT PROFILE, ADULT - CAREGIVER PHONE NUMBER
Received page at 457 PM from Catrachita at [facility] regarding the patient. Returned call at 502 PM.     Catrachita states that she was calling to let us know that Ms Solorio fell today without injury and without hitting her head. She states that the patient is back in her normal state and is eating.     I informed Catrachita that the patient should be taken to the ER if the patient continues to have falls or falls that are associated with losses of consciousness or blows to the head. She verbalized understanding. I will pass the message along to Dr Lema so that the patient can be seen in the office as there is another fall documented on August 27.            [phone]

## 2022-04-02 NOTE — ASU DISCHARGE PLAN (ADULT/PEDIATRIC) - RETURN TO WORK/SCHOOL
Problem: Falls - Risk of:  Goal: Will remain free from falls  Description: Will remain free from falls  4/2/2022 0126 by Cris Salazar RN  Outcome: Ongoing  No falls noted. Call light in reach, bed in lowest position, nonslip socks on, patient educated on fall risk. Problem: Pain:  Goal: Pain level will decrease  Description: Pain level will decrease  4/2/2022 0126 by Cris Salazar RN  Outcome: Ongoing  No pain noted     Problem: Skin Integrity:  Goal: Absence of new skin breakdown  Description: Absence of new skin breakdown  4/2/2022 0126 by Cris Salazar RN  Outcome: Ongoing   No skin issues noted     Problem: Bleeding:  Goal: Will show no signs and symptoms of excessive bleeding  Description: Will show no signs and symptoms of excessive bleeding  4/2/2022 0126 by Cris Salazar RN  Outcome: Ongoing  No s/sx of bleeding     Problem: Venous Thromboembolism:  Goal: Will show no signs or symptoms of venous thromboembolism  Description: Will show no signs or symptoms of venous thromboembolism  4/2/2022 0126 by Cris Salazar RN  Outcome: Ongoing  Pt on PO eliquis at this time. when you feel able. Avoid trauma to the area and keep your leg elevated, avoid prolonged walking or standing./Yes

## 2023-01-23 ENCOUNTER — OUTPATIENT (OUTPATIENT)
Dept: OUTPATIENT SERVICES | Facility: HOSPITAL | Age: 78
LOS: 1 days | Discharge: HOME | End: 2023-01-23
Payer: MEDICARE

## 2023-01-23 DIAGNOSIS — N20.0 CALCULUS OF KIDNEY: ICD-10-CM

## 2023-01-23 DIAGNOSIS — Z98.890 OTHER SPECIFIED POSTPROCEDURAL STATES: Chronic | ICD-10-CM

## 2023-01-23 PROCEDURE — 74019 RADEX ABDOMEN 2 VIEWS: CPT | Mod: 26

## 2023-06-20 ENCOUNTER — OUTPATIENT (OUTPATIENT)
Dept: OUTPATIENT SERVICES | Facility: HOSPITAL | Age: 78
LOS: 1 days | End: 2023-06-20

## 2023-06-20 ENCOUNTER — APPOINTMENT (OUTPATIENT)
Dept: CT IMAGING | Facility: CLINIC | Age: 78
End: 2023-06-20
Payer: MEDICARE

## 2023-06-20 DIAGNOSIS — Z98.890 OTHER SPECIFIED POSTPROCEDURAL STATES: Chronic | ICD-10-CM

## 2023-06-20 PROCEDURE — 75574 CT ANGIO HRT W/3D IMAGE: CPT | Mod: 26,MH

## 2023-07-31 ENCOUNTER — INPATIENT (INPATIENT)
Facility: HOSPITAL | Age: 78
LOS: 1 days | Discharge: HOME | DRG: 299 | End: 2023-08-02
Attending: INTERNAL MEDICINE | Admitting: INTERNAL MEDICINE
Payer: MEDICARE

## 2023-07-31 VITALS
SYSTOLIC BLOOD PRESSURE: 166 MMHG | WEIGHT: 207.01 LBS | OXYGEN SATURATION: 97 % | DIASTOLIC BLOOD PRESSURE: 98 MMHG | HEART RATE: 73 BPM | TEMPERATURE: 98 F | RESPIRATION RATE: 18 BRPM

## 2023-07-31 DIAGNOSIS — I82.412 ACUTE EMBOLISM AND THROMBOSIS OF LEFT FEMORAL VEIN: ICD-10-CM

## 2023-07-31 DIAGNOSIS — E87.5 HYPERKALEMIA: ICD-10-CM

## 2023-07-31 DIAGNOSIS — Z98.890 OTHER SPECIFIED POSTPROCEDURAL STATES: Chronic | ICD-10-CM

## 2023-07-31 DIAGNOSIS — I26.93 SINGLE SUBSEGMENTAL PULMONARY EMBOLISM WITHOUT ACUTE COR PULMONALE: ICD-10-CM

## 2023-07-31 DIAGNOSIS — N40.0 BENIGN PROSTATIC HYPERPLASIA WITHOUT LOWER URINARY TRACT SYMPTOMS: ICD-10-CM

## 2023-07-31 DIAGNOSIS — R91.8 OTHER NONSPECIFIC ABNORMAL FINDING OF LUNG FIELD: ICD-10-CM

## 2023-07-31 DIAGNOSIS — E78.5 HYPERLIPIDEMIA, UNSPECIFIED: ICD-10-CM

## 2023-07-31 DIAGNOSIS — F32.A DEPRESSION, UNSPECIFIED: ICD-10-CM

## 2023-07-31 DIAGNOSIS — I82.432 ACUTE EMBOLISM AND THROMBOSIS OF LEFT POPLITEAL VEIN: ICD-10-CM

## 2023-07-31 DIAGNOSIS — I10 ESSENTIAL (PRIMARY) HYPERTENSION: ICD-10-CM

## 2023-07-31 DIAGNOSIS — I87.8 OTHER SPECIFIED DISORDERS OF VEINS: ICD-10-CM

## 2023-07-31 LAB
ALBUMIN SERPL ELPH-MCNC: 4.8 G/DL — SIGNIFICANT CHANGE UP (ref 3.5–5.2)
ALP SERPL-CCNC: 90 U/L — SIGNIFICANT CHANGE UP (ref 30–115)
ALT FLD-CCNC: 22 U/L — SIGNIFICANT CHANGE UP (ref 0–41)
ANION GAP SERPL CALC-SCNC: 9 MMOL/L — SIGNIFICANT CHANGE UP (ref 7–14)
APTT BLD: 30.5 SEC — SIGNIFICANT CHANGE UP (ref 27–39.2)
AST SERPL-CCNC: 27 U/L — SIGNIFICANT CHANGE UP (ref 0–41)
BASOPHILS # BLD AUTO: 0.05 K/UL — SIGNIFICANT CHANGE UP (ref 0–0.2)
BASOPHILS NFR BLD AUTO: 0.7 % — SIGNIFICANT CHANGE UP (ref 0–1)
BILIRUB SERPL-MCNC: 0.7 MG/DL — SIGNIFICANT CHANGE UP (ref 0.2–1.2)
BUN SERPL-MCNC: 20 MG/DL — SIGNIFICANT CHANGE UP (ref 10–20)
CALCIUM SERPL-MCNC: 10.2 MG/DL — SIGNIFICANT CHANGE UP (ref 8.4–10.4)
CHLORIDE SERPL-SCNC: 103 MMOL/L — SIGNIFICANT CHANGE UP (ref 98–110)
CO2 SERPL-SCNC: 28 MMOL/L — SIGNIFICANT CHANGE UP (ref 17–32)
CREAT SERPL-MCNC: 1.2 MG/DL — SIGNIFICANT CHANGE UP (ref 0.7–1.5)
EGFR: 63 ML/MIN/1.73M2 — SIGNIFICANT CHANGE UP
EOSINOPHIL # BLD AUTO: 0.19 K/UL — SIGNIFICANT CHANGE UP (ref 0–0.7)
EOSINOPHIL NFR BLD AUTO: 2.7 % — SIGNIFICANT CHANGE UP (ref 0–8)
GLUCOSE SERPL-MCNC: 72 MG/DL — SIGNIFICANT CHANGE UP (ref 70–99)
HCT VFR BLD CALC: 50.1 % — SIGNIFICANT CHANGE UP (ref 42–52)
HGB BLD-MCNC: 16.7 G/DL — SIGNIFICANT CHANGE UP (ref 14–18)
IMM GRANULOCYTES NFR BLD AUTO: 0.3 % — SIGNIFICANT CHANGE UP (ref 0.1–0.3)
INR BLD: 0.99 RATIO — SIGNIFICANT CHANGE UP (ref 0.65–1.3)
LYMPHOCYTES # BLD AUTO: 1.91 K/UL — SIGNIFICANT CHANGE UP (ref 1.2–3.4)
LYMPHOCYTES # BLD AUTO: 27.1 % — SIGNIFICANT CHANGE UP (ref 20.5–51.1)
MAGNESIUM SERPL-MCNC: 2 MG/DL — SIGNIFICANT CHANGE UP (ref 1.8–2.4)
MCHC RBC-ENTMCNC: 31.6 PG — HIGH (ref 27–31)
MCHC RBC-ENTMCNC: 33.3 G/DL — SIGNIFICANT CHANGE UP (ref 32–37)
MCV RBC AUTO: 94.9 FL — HIGH (ref 80–94)
MONOCYTES # BLD AUTO: 0.71 K/UL — HIGH (ref 0.1–0.6)
MONOCYTES NFR BLD AUTO: 10.1 % — HIGH (ref 1.7–9.3)
NEUTROPHILS # BLD AUTO: 4.16 K/UL — SIGNIFICANT CHANGE UP (ref 1.4–6.5)
NEUTROPHILS NFR BLD AUTO: 59.1 % — SIGNIFICANT CHANGE UP (ref 42.2–75.2)
NRBC # BLD: 0 /100 WBCS — SIGNIFICANT CHANGE UP (ref 0–0)
NT-PROBNP SERPL-SCNC: 99 PG/ML — SIGNIFICANT CHANGE UP (ref 0–300)
PLATELET # BLD AUTO: 203 K/UL — SIGNIFICANT CHANGE UP (ref 130–400)
PMV BLD: 10.5 FL — HIGH (ref 7.4–10.4)
POTASSIUM SERPL-MCNC: 5.6 MMOL/L — HIGH (ref 3.5–5)
POTASSIUM SERPL-SCNC: 5.6 MMOL/L — HIGH (ref 3.5–5)
PROT SERPL-MCNC: 7.4 G/DL — SIGNIFICANT CHANGE UP (ref 6–8)
PROTHROM AB SERPL-ACNC: 11.3 SEC — SIGNIFICANT CHANGE UP (ref 9.95–12.87)
RBC # BLD: 5.28 M/UL — SIGNIFICANT CHANGE UP (ref 4.7–6.1)
RBC # FLD: 14 % — SIGNIFICANT CHANGE UP (ref 11.5–14.5)
SODIUM SERPL-SCNC: 140 MMOL/L — SIGNIFICANT CHANGE UP (ref 135–146)
TROPONIN T SERPL-MCNC: <0.01 NG/ML — SIGNIFICANT CHANGE UP
WBC # BLD: 7.04 K/UL — SIGNIFICANT CHANGE UP (ref 4.8–10.8)
WBC # FLD AUTO: 7.04 K/UL — SIGNIFICANT CHANGE UP (ref 4.8–10.8)

## 2023-07-31 PROCEDURE — 99285 EMERGENCY DEPT VISIT HI MDM: CPT | Mod: FS

## 2023-07-31 PROCEDURE — 71045 X-RAY EXAM CHEST 1 VIEW: CPT | Mod: 26

## 2023-07-31 PROCEDURE — 93970 EXTREMITY STUDY: CPT | Mod: 26

## 2023-07-31 RX ORDER — LOSARTAN POTASSIUM 100 MG/1
100 TABLET, FILM COATED ORAL DAILY
Refills: 0 | Status: DISCONTINUED | OUTPATIENT
Start: 2023-07-31 | End: 2023-08-02

## 2023-07-31 RX ORDER — HEPARIN SODIUM 5000 [USP'U]/ML
7500 INJECTION INTRAVENOUS; SUBCUTANEOUS ONCE
Refills: 0 | Status: COMPLETED | OUTPATIENT
Start: 2023-07-31 | End: 2023-08-01

## 2023-07-31 RX ORDER — SODIUM CHLORIDE 9 MG/ML
1000 INJECTION INTRAMUSCULAR; INTRAVENOUS; SUBCUTANEOUS ONCE
Refills: 0 | Status: COMPLETED | OUTPATIENT
Start: 2023-07-31 | End: 2023-07-31

## 2023-07-31 RX ORDER — HEPARIN SODIUM 5000 [USP'U]/ML
7500 INJECTION INTRAVENOUS; SUBCUTANEOUS EVERY 6 HOURS
Refills: 0 | Status: DISCONTINUED | OUTPATIENT
Start: 2023-07-31 | End: 2023-08-01

## 2023-07-31 RX ORDER — HEPARIN SODIUM 5000 [USP'U]/ML
INJECTION INTRAVENOUS; SUBCUTANEOUS
Qty: 25000 | Refills: 0 | Status: DISCONTINUED | OUTPATIENT
Start: 2023-07-31 | End: 2023-08-01

## 2023-07-31 RX ORDER — HEPARIN SODIUM 5000 [USP'U]/ML
3500 INJECTION INTRAVENOUS; SUBCUTANEOUS EVERY 6 HOURS
Refills: 0 | Status: DISCONTINUED | OUTPATIENT
Start: 2023-07-31 | End: 2023-08-01

## 2023-07-31 RX ORDER — AMLODIPINE BESYLATE 2.5 MG/1
5 TABLET ORAL ONCE
Refills: 0 | Status: COMPLETED | OUTPATIENT
Start: 2023-07-31 | End: 2023-07-31

## 2023-07-31 RX ADMIN — AMLODIPINE BESYLATE 5 MILLIGRAM(S): 2.5 TABLET ORAL at 22:04

## 2023-07-31 RX ADMIN — LOSARTAN POTASSIUM 100 MILLIGRAM(S): 100 TABLET, FILM COATED ORAL at 22:05

## 2023-07-31 RX ADMIN — SODIUM CHLORIDE 1000 MILLILITER(S): 9 INJECTION INTRAMUSCULAR; INTRAVENOUS; SUBCUTANEOUS at 22:04

## 2023-07-31 NOTE — ED PROVIDER NOTE - ATTENDING APP SHARED VISIT CONTRIBUTION OF CARE
77-year-old male, past medical history as documented presenting with atraumatic left lower extremity swelling has been worsening over the past few weeks.  Patient also complaining of dyspnea on exertion over this time.  States that he saw his cardiologist Dr. Marin today and was sent for Doppler which was obtained that showed DVT and therefore patient was sent to the ED for evaluation.  Per patient, his cardiologist wanted a CTA of his chest as well to rule out PE.  Otherwise denies fevers, cough, chest pain, nausea/vomiting/diarrhea, blood in stool, urinary symptoms or any other complaints.    Vital Signs: I have reviewed the initial vital signs.  Constitutional: NAD, well-nourished, appears stated age, no acute distress.  HEENT: Airway patent, moist MM, no erythema/swelling/deformity of oral structures. EOMI, PERRLA.  CV: regular rate, regular rhythm, well-perfused extremities, 2+ b/l DP and radial pulses equal.  Lungs: BCTA, no increased WOB.  ABD: NTND, no guarding or rebound, no pulsatile mass, no hernias.   MSK: Neck supple, nontender, nl ROM, no stepoff. Chest nontender. Back nontender in TLS spine or to b/l bony structures or flanks. Ext nontender, nl rom, no deformity. (+) LLE swollen compared to R but compartments soft, no crepitus, no evidence of infection  INTEG: Skin warm, dry, no rash.  NEURO: A&Ox3, normal strength, nl sensation throughout, normal speech.   PSYCH: Calm, cooperative, normal affect and interaction.    Will obtain labs, EKG, venous duplex for confirmation, CTA chest given dyspnea on exertion, re-eval. I personally saw the patient. DONALD Banks and I provided critical care for a total of 35 minutes. I provided a substantive portion of the care and the majority of the critical care time.     77-year-old male, past medical history as documented presenting with atraumatic left lower extremity swelling has been worsening over the past few weeks.  Patient also complaining of dyspnea on exertion over this time.  States that he saw his cardiologist Dr. Marin today and was sent for Doppler which was obtained that showed DVT and therefore patient was sent to the ED for evaluation.  Per patient, his cardiologist wanted a CTA of his chest as well to rule out PE.  Otherwise denies fevers, cough, chest pain, nausea/vomiting/diarrhea, blood in stool, urinary symptoms or any other complaints.    Vital Signs: I have reviewed the initial vital signs.  Constitutional: NAD, well-nourished, appears stated age, no acute distress.  HEENT: Airway patent, moist MM, no erythema/swelling/deformity of oral structures. EOMI, PERRLA.  CV: regular rate, regular rhythm, well-perfused extremities, 2+ b/l DP and radial pulses equal.  Lungs: BCTA, no increased WOB.  ABD: NTND, no guarding or rebound, no pulsatile mass, no hernias.   MSK: Neck supple, nontender, nl ROM, no stepoff. Chest nontender. Back nontender in TLS spine or to b/l bony structures or flanks. Ext nontender, nl rom, no deformity. (+) LLE swollen compared to R but compartments soft, no crepitus, no evidence of infection  INTEG: Skin warm, dry, no rash.  NEURO: A&Ox3, normal strength, nl sensation throughout, normal speech.   PSYCH: Calm, cooperative, normal affect and interaction.    Will obtain labs, EKG, venous duplex for confirmation, CTA chest given dyspnea on exertion, re-eval.

## 2023-07-31 NOTE — ED PROVIDER NOTE - CLINICAL SUMMARY MEDICAL DECISION MAKING FREE TEXT BOX
Labs and EKG were ordered and reviewed, where indicated.  Imaging was ordered and reviewed by me, where indicated.  Appropriate medications for patient's presenting complaints were ordered and effects were reassessed, where indicated.  Patient's records (prior hospital, ED visit, and/or nursing home note) were reviewed, if available.  Additional history was obtained from EMS, family, and/or PCP (where available).  Escalation to admission/observation was considered.  Patient requires inpatient hospitalization - monitored setting.     Authored by Dr. Deloris Schulz:  s/o to me by Dr. Amaya - Left lower extremity DVT diagnosed as outpatient referred to ED for mgmt, with complaints of recent shortness of breath, repeat duplex in ED showing extensive left lower extremity DVT, heparin bolus and drip initiated and vascular consulted will follow, signed out to me pending CTPA which showed bilateral segmental PEs with CT evidence of right heart strain–patient reassessed, HD stable, MICU consulted, rec sdu admission for further management

## 2023-07-31 NOTE — ED PROVIDER NOTE - NS ED ATTENDING STATEMENT MOD
This was a shared visit with the LOWELL. I reviewed and verified the documentation and independently performed the documented:

## 2023-07-31 NOTE — ED PROVIDER NOTE - PROGRESS NOTE DETAILS
MICHAELA: Case endorsed to Dr. Schulz pending vascular recs, CTA chest, re-eval, dispo. Spoke with ICU fellow and was told that no need to activate the PERC pathway and the patient can be admitted to stepdown unit.  Vascular surgeon has been made aware of the DVT study result.  Patient is aware of the plan and agrees.  Patient has been endorsed to MAR.

## 2023-07-31 NOTE — ED PROVIDER NOTE - OBJECTIVE STATEMENT
77-year-old male with a past medical history of hypertension, hyperlipidemia, BPH, and depression who was sent in by his cardiologist here for possible DVT in the left leg.  Reports that he has been having pain in his left leg for the past few months along with shortness of breath since a few weeks ago.  Patient was seen by his primary care doctor in the office and was told to come to the ED.  Denies chest pain, nausea, vomiting, abdominal pain, urinary symptoms, change with bowel movement.  Denies recent trauma, immobilization, surgery, hospitalization, history of blood clot/cancer.

## 2023-07-31 NOTE — ED ADULT NURSE NOTE - NSFALLUNIVINTERV_ED_ALL_ED
Bed/Stretcher in lowest position, wheels locked, appropriate side rails in place/Call bell, personal items and telephone in reach/Instruct patient to call for assistance before getting out of bed/chair/stretcher/Non-slip footwear applied when patient is off stretcher/[place] to call system/Physically safe environment - no spills, clutter or unnecessary equipment/Purposeful proactive rounding/Room/bathroom lighting operational, light cord in reach

## 2023-07-31 NOTE — ED PROVIDER NOTE - PHYSICAL EXAMINATION
CONSTITUTIONAL: in no apparent distress.   ENT: Hearing is intact with good acuity to spoken voice.  Patient is speaking clearly, not muffled and airway is intact.   RESPIRATORY: No signs of respiratory distress. Lung sounds are clear in all lobes bilaterally without rales, rhonchi, or wheezes.  CARDIOVASCULAR: Regular rate and rhythm.   GI: Abdomen is soft, non-tender, and without distention.   MS: LLE with no obvious deformity, but swelling; nontender to palpation; full ROM; sensory function intact; distal pulse present. Rest of the upper and lower extremities unremarkable.   NEURO: A & O x 3. Normal speech. No focal deficit.  PSYCHOLOGICAL: Appropriate mood and affect. Good judgement and insight.

## 2023-07-31 NOTE — ED ADULT NURSE NOTE - PRO INTERPRETER NEED 2
English Tranexamic Acid Counseling:  Patient advised of the small risk of bleeding problems with tranexamic acid. They were also instructed to call if they developed any nausea, vomiting or diarrhea. All of the patient's questions and concerns were addressed.

## 2023-08-01 DIAGNOSIS — I26.99 OTHER PULMONARY EMBOLISM WITHOUT ACUTE COR PULMONALE: ICD-10-CM

## 2023-08-01 DIAGNOSIS — I80.10 PHLEBITIS AND THROMBOPHLEBITIS OF UNSPECIFIED FEMORAL VEIN: ICD-10-CM

## 2023-08-01 LAB
ALBUMIN SERPL ELPH-MCNC: 4.6 G/DL — SIGNIFICANT CHANGE UP (ref 3.5–5.2)
ALP SERPL-CCNC: 94 U/L — SIGNIFICANT CHANGE UP (ref 30–115)
ALT FLD-CCNC: 21 U/L — SIGNIFICANT CHANGE UP (ref 0–41)
ANION GAP SERPL CALC-SCNC: 12 MMOL/L — SIGNIFICANT CHANGE UP (ref 7–14)
ANION GAP SERPL CALC-SCNC: 13 MMOL/L — SIGNIFICANT CHANGE UP (ref 7–14)
APTT BLD: 175.7 SEC — CRITICAL HIGH (ref 27–39.2)
AST SERPL-CCNC: 25 U/L — SIGNIFICANT CHANGE UP (ref 0–41)
BASOPHILS # BLD AUTO: 0.07 K/UL — SIGNIFICANT CHANGE UP (ref 0–0.2)
BASOPHILS NFR BLD AUTO: 0.9 % — SIGNIFICANT CHANGE UP (ref 0–1)
BILIRUB SERPL-MCNC: 1 MG/DL — SIGNIFICANT CHANGE UP (ref 0.2–1.2)
BUN SERPL-MCNC: 15 MG/DL — SIGNIFICANT CHANGE UP (ref 10–20)
BUN SERPL-MCNC: 16 MG/DL — SIGNIFICANT CHANGE UP (ref 10–20)
CALCIUM SERPL-MCNC: 10.2 MG/DL — SIGNIFICANT CHANGE UP (ref 8.4–10.5)
CALCIUM SERPL-MCNC: 10.2 MG/DL — SIGNIFICANT CHANGE UP (ref 8.4–10.5)
CHLORIDE SERPL-SCNC: 102 MMOL/L — SIGNIFICANT CHANGE UP (ref 98–110)
CHLORIDE SERPL-SCNC: 104 MMOL/L — SIGNIFICANT CHANGE UP (ref 98–110)
CO2 SERPL-SCNC: 24 MMOL/L — SIGNIFICANT CHANGE UP (ref 17–32)
CO2 SERPL-SCNC: 25 MMOL/L — SIGNIFICANT CHANGE UP (ref 17–32)
CREAT SERPL-MCNC: 1.1 MG/DL — SIGNIFICANT CHANGE UP (ref 0.7–1.5)
CREAT SERPL-MCNC: 1.1 MG/DL — SIGNIFICANT CHANGE UP (ref 0.7–1.5)
EGFR: 70 ML/MIN/1.73M2 — SIGNIFICANT CHANGE UP
EGFR: 70 ML/MIN/1.73M2 — SIGNIFICANT CHANGE UP
EOSINOPHIL # BLD AUTO: 0.28 K/UL — SIGNIFICANT CHANGE UP (ref 0–0.7)
EOSINOPHIL NFR BLD AUTO: 3.7 % — SIGNIFICANT CHANGE UP (ref 0–8)
GLUCOSE SERPL-MCNC: 93 MG/DL — SIGNIFICANT CHANGE UP (ref 70–99)
GLUCOSE SERPL-MCNC: 94 MG/DL — SIGNIFICANT CHANGE UP (ref 70–99)
HCT VFR BLD CALC: 50.8 % — SIGNIFICANT CHANGE UP (ref 42–52)
HCT VFR BLD CALC: 50.9 % — SIGNIFICANT CHANGE UP (ref 42–52)
HGB BLD-MCNC: 16.9 G/DL — SIGNIFICANT CHANGE UP (ref 14–18)
HGB BLD-MCNC: 17.4 G/DL — SIGNIFICANT CHANGE UP (ref 14–18)
IMM GRANULOCYTES NFR BLD AUTO: 0.3 % — SIGNIFICANT CHANGE UP (ref 0.1–0.3)
LYMPHOCYTES # BLD AUTO: 2.68 K/UL — SIGNIFICANT CHANGE UP (ref 1.2–3.4)
LYMPHOCYTES # BLD AUTO: 35.7 % — SIGNIFICANT CHANGE UP (ref 20.5–51.1)
MAGNESIUM SERPL-MCNC: 1.9 MG/DL — SIGNIFICANT CHANGE UP (ref 1.8–2.4)
MCHC RBC-ENTMCNC: 31.7 PG — HIGH (ref 27–31)
MCHC RBC-ENTMCNC: 32.6 PG — HIGH (ref 27–31)
MCHC RBC-ENTMCNC: 33.2 G/DL — SIGNIFICANT CHANGE UP (ref 32–37)
MCHC RBC-ENTMCNC: 34.3 G/DL — SIGNIFICANT CHANGE UP (ref 32–37)
MCV RBC AUTO: 95.1 FL — HIGH (ref 80–94)
MCV RBC AUTO: 95.5 FL — HIGH (ref 80–94)
MONOCYTES # BLD AUTO: 0.63 K/UL — HIGH (ref 0.1–0.6)
MONOCYTES NFR BLD AUTO: 8.4 % — SIGNIFICANT CHANGE UP (ref 1.7–9.3)
NEUTROPHILS # BLD AUTO: 3.83 K/UL — SIGNIFICANT CHANGE UP (ref 1.4–6.5)
NEUTROPHILS NFR BLD AUTO: 51 % — SIGNIFICANT CHANGE UP (ref 42.2–75.2)
NRBC # BLD: 0 /100 WBCS — SIGNIFICANT CHANGE UP (ref 0–0)
NRBC # BLD: 0 /100 WBCS — SIGNIFICANT CHANGE UP (ref 0–0)
PLATELET # BLD AUTO: 190 K/UL — SIGNIFICANT CHANGE UP (ref 130–400)
PLATELET # BLD AUTO: 200 K/UL — SIGNIFICANT CHANGE UP (ref 130–400)
PMV BLD: 10.4 FL — SIGNIFICANT CHANGE UP (ref 7.4–10.4)
PMV BLD: 10.6 FL — HIGH (ref 7.4–10.4)
POTASSIUM SERPL-MCNC: 4.8 MMOL/L — SIGNIFICANT CHANGE UP (ref 3.5–5)
POTASSIUM SERPL-MCNC: 4.9 MMOL/L — SIGNIFICANT CHANGE UP (ref 3.5–5)
POTASSIUM SERPL-SCNC: 4.8 MMOL/L — SIGNIFICANT CHANGE UP (ref 3.5–5)
POTASSIUM SERPL-SCNC: 4.9 MMOL/L — SIGNIFICANT CHANGE UP (ref 3.5–5)
PROT SERPL-MCNC: 7.2 G/DL — SIGNIFICANT CHANGE UP (ref 6–8)
RBC # BLD: 5.33 M/UL — SIGNIFICANT CHANGE UP (ref 4.7–6.1)
RBC # BLD: 5.34 M/UL — SIGNIFICANT CHANGE UP (ref 4.7–6.1)
RBC # FLD: 14.1 % — SIGNIFICANT CHANGE UP (ref 11.5–14.5)
RBC # FLD: 14.1 % — SIGNIFICANT CHANGE UP (ref 11.5–14.5)
SODIUM SERPL-SCNC: 139 MMOL/L — SIGNIFICANT CHANGE UP (ref 135–146)
SODIUM SERPL-SCNC: 141 MMOL/L — SIGNIFICANT CHANGE UP (ref 135–146)
TROPONIN T SERPL-MCNC: <0.01 NG/ML — SIGNIFICANT CHANGE UP
TROPONIN T SERPL-MCNC: <0.01 NG/ML — SIGNIFICANT CHANGE UP
WBC # BLD: 7.51 K/UL — SIGNIFICANT CHANGE UP (ref 4.8–10.8)
WBC # BLD: 7.72 K/UL — SIGNIFICANT CHANGE UP (ref 4.8–10.8)
WBC # FLD AUTO: 7.51 K/UL — SIGNIFICANT CHANGE UP (ref 4.8–10.8)
WBC # FLD AUTO: 7.72 K/UL — SIGNIFICANT CHANGE UP (ref 4.8–10.8)

## 2023-08-01 PROCEDURE — 80053 COMPREHEN METABOLIC PANEL: CPT

## 2023-08-01 PROCEDURE — 93306 TTE W/DOPPLER COMPLETE: CPT | Mod: 26

## 2023-08-01 PROCEDURE — 93005 ELECTROCARDIOGRAM TRACING: CPT

## 2023-08-01 PROCEDURE — 86803 HEPATITIS C AB TEST: CPT

## 2023-08-01 PROCEDURE — 85027 COMPLETE CBC AUTOMATED: CPT

## 2023-08-01 PROCEDURE — 36415 COLL VENOUS BLD VENIPUNCTURE: CPT

## 2023-08-01 PROCEDURE — 93306 TTE W/DOPPLER COMPLETE: CPT

## 2023-08-01 PROCEDURE — 99223 1ST HOSP IP/OBS HIGH 75: CPT | Mod: AI

## 2023-08-01 PROCEDURE — 84484 ASSAY OF TROPONIN QUANT: CPT

## 2023-08-01 PROCEDURE — 99222 1ST HOSP IP/OBS MODERATE 55: CPT

## 2023-08-01 PROCEDURE — 85025 COMPLETE CBC W/AUTO DIFF WBC: CPT

## 2023-08-01 PROCEDURE — 93010 ELECTROCARDIOGRAM REPORT: CPT

## 2023-08-01 PROCEDURE — 80048 BASIC METABOLIC PNL TOTAL CA: CPT

## 2023-08-01 PROCEDURE — 85730 THROMBOPLASTIN TIME PARTIAL: CPT

## 2023-08-01 PROCEDURE — 83735 ASSAY OF MAGNESIUM: CPT

## 2023-08-01 PROCEDURE — 71275 CT ANGIOGRAPHY CHEST: CPT | Mod: 26,MA

## 2023-08-01 RX ORDER — ROSUVASTATIN CALCIUM 5 MG/1
1 TABLET ORAL
Qty: 0 | Refills: 0 | DISCHARGE

## 2023-08-01 RX ORDER — FINASTERIDE 5 MG/1
1 TABLET, FILM COATED ORAL
Refills: 0 | DISCHARGE

## 2023-08-01 RX ORDER — AMLODIPINE BESYLATE 2.5 MG/1
1 TABLET ORAL
Refills: 0 | DISCHARGE

## 2023-08-01 RX ORDER — TAMSULOSIN HYDROCHLORIDE 0.4 MG/1
1 CAPSULE ORAL
Refills: 0 | DISCHARGE

## 2023-08-01 RX ORDER — OLMESARTAN MEDOXOMIL 5 MG/1
1 TABLET, FILM COATED ORAL
Refills: 0 | DISCHARGE

## 2023-08-01 RX ORDER — POTASSIUM CITRATE MONOHYDRATE 100 %
1 POWDER (GRAM) MISCELLANEOUS
Refills: 0 | DISCHARGE

## 2023-08-01 RX ORDER — FINASTERIDE 5 MG/1
5 TABLET, FILM COATED ORAL DAILY
Refills: 0 | Status: DISCONTINUED | OUTPATIENT
Start: 2023-08-01 | End: 2023-08-02

## 2023-08-01 RX ORDER — OLMESARTAN MEDOXOMIL 5 MG/1
1 TABLET, FILM COATED ORAL
Qty: 0 | Refills: 0 | DISCHARGE

## 2023-08-01 RX ORDER — ROSUVASTATIN CALCIUM 5 MG/1
1 TABLET ORAL
Refills: 0 | DISCHARGE

## 2023-08-01 RX ORDER — AMLODIPINE BESYLATE 2.5 MG/1
5 TABLET ORAL DAILY
Refills: 0 | Status: DISCONTINUED | OUTPATIENT
Start: 2023-08-01 | End: 2023-08-02

## 2023-08-01 RX ORDER — FINASTERIDE 5 MG/1
1 TABLET, FILM COATED ORAL
Qty: 0 | Refills: 0 | DISCHARGE

## 2023-08-01 RX ORDER — VILAZODONE HYDROCHLORIDE 20 MG/1
1 TABLET, FILM COATED ORAL
Qty: 0 | Refills: 0 | DISCHARGE

## 2023-08-01 RX ORDER — TAMSULOSIN HYDROCHLORIDE 0.4 MG/1
0.4 CAPSULE ORAL AT BEDTIME
Refills: 0 | Status: DISCONTINUED | OUTPATIENT
Start: 2023-08-01 | End: 2023-08-02

## 2023-08-01 RX ORDER — ACETAMINOPHEN 500 MG
650 TABLET ORAL EVERY 6 HOURS
Refills: 0 | Status: DISCONTINUED | OUTPATIENT
Start: 2023-08-01 | End: 2023-08-02

## 2023-08-01 RX ORDER — LANOLIN ALCOHOL/MO/W.PET/CERES
3 CREAM (GRAM) TOPICAL AT BEDTIME
Refills: 0 | Status: DISCONTINUED | OUTPATIENT
Start: 2023-08-01 | End: 2023-08-02

## 2023-08-01 RX ORDER — VILAZODONE HYDROCHLORIDE 20 MG/1
1 TABLET, FILM COATED ORAL
Refills: 0 | DISCHARGE

## 2023-08-01 RX ORDER — POTASSIUM CHLORIDE 20 MEQ
0 PACKET (EA) ORAL
Qty: 0 | Refills: 0 | DISCHARGE

## 2023-08-01 RX ORDER — ENOXAPARIN SODIUM 100 MG/ML
80 INJECTION SUBCUTANEOUS EVERY 12 HOURS
Refills: 0 | Status: DISCONTINUED | OUTPATIENT
Start: 2023-08-01 | End: 2023-08-02

## 2023-08-01 RX ORDER — ASPIRIN/CALCIUM CARB/MAGNESIUM 324 MG
1 TABLET ORAL
Qty: 0 | Refills: 0 | DISCHARGE

## 2023-08-01 RX ORDER — ATORVASTATIN CALCIUM 80 MG/1
40 TABLET, FILM COATED ORAL AT BEDTIME
Refills: 0 | Status: DISCONTINUED | OUTPATIENT
Start: 2023-08-01 | End: 2023-08-02

## 2023-08-01 RX ORDER — TAMSULOSIN HYDROCHLORIDE 0.4 MG/1
1 CAPSULE ORAL
Qty: 0 | Refills: 0 | DISCHARGE

## 2023-08-01 RX ADMIN — FINASTERIDE 5 MILLIGRAM(S): 5 TABLET, FILM COATED ORAL at 11:43

## 2023-08-01 RX ADMIN — Medication 650 MILLIGRAM(S): at 22:20

## 2023-08-01 RX ADMIN — TAMSULOSIN HYDROCHLORIDE 0.4 MILLIGRAM(S): 0.4 CAPSULE ORAL at 22:27

## 2023-08-01 RX ADMIN — Medication 650 MILLIGRAM(S): at 20:18

## 2023-08-01 RX ADMIN — Medication 3 MILLIGRAM(S): at 22:27

## 2023-08-01 RX ADMIN — AMLODIPINE BESYLATE 5 MILLIGRAM(S): 2.5 TABLET ORAL at 06:07

## 2023-08-01 RX ADMIN — LOSARTAN POTASSIUM 100 MILLIGRAM(S): 100 TABLET, FILM COATED ORAL at 06:07

## 2023-08-01 RX ADMIN — ENOXAPARIN SODIUM 80 MILLIGRAM(S): 100 INJECTION SUBCUTANEOUS at 11:42

## 2023-08-01 RX ADMIN — HEPARIN SODIUM 5000 UNIT(S): 5000 INJECTION INTRAVENOUS; SUBCUTANEOUS at 01:50

## 2023-08-01 RX ADMIN — HEPARIN SODIUM 1700 UNIT(S)/HR: 5000 INJECTION INTRAVENOUS; SUBCUTANEOUS at 01:51

## 2023-08-01 RX ADMIN — ATORVASTATIN CALCIUM 40 MILLIGRAM(S): 80 TABLET, FILM COATED ORAL at 22:27

## 2023-08-01 NOTE — CONSULT NOTE ADULT - NS ATTEND AMEND GEN_ALL_CORE FT
I personally reviewed the venous duplex- there is evidence of DVT in LLE. He also has PE with right heart strain.  IR service is called for PE intervention.  Patient needs to be kept on therapeutic anticoagulation.

## 2023-08-01 NOTE — H&P ADULT - NSHPPHYSICALEXAM_GEN_ALL_CORE
denies pain/discomfort
GENERAL: AAOx4. Well-nourished, laying in bed appearing in no acute distress  HEENT: No FNDs, atraumatic, normocephalic, moist mucus membranes  LUNGS: Clear to auscultation bilaterally  HEART: S1/S2. No heaves or thrills  ABD: Soft, non-tender, non-distended. Bowel sounds present in all quadrants.  EXT/NEURO: LLE tense and tender below knee with distended superficial veins. 5/5 strength in all extremity joints. Sensation and ROM grossly intact.  SKIN: No breaks, erythema

## 2023-08-01 NOTE — H&P ADULT - ATTENDING COMMENTS
Patient seen and examined in the ED-Hold area   remains dyspneic on minimal exertion but not requiring O2   On P/E please refer to above,   In summary,   - bilateral subsegmental PEs and Left DVT in a patient known with chronic bilateral venous stasis and chronic LE edema   - Left LE DVT, no evidence of post phlebitic syndrome on physical exam (no paresthesias, skin color changes, or diminished/absent peripheral pulse)   - HLD/HTN/BPH     PLAN  - no indication for embolectomy as per IR and vascular teams after eval (Echo, CT, clinical status)   - patient need to ambulate and check pulse ox on RA   - Switch to Low-Molecular Weight Heparin (Lovenox) today and may start Novel Oral Anti-Coagulant (NOAC) twice daily with loading dose (Eliquis) if insurance coverage and co-pay is not an issue   - Need to send Eliquis script to patient's pharmacy for cost inquiry   - "prepare for discharge" notice provided and order entered.   - Needs Follow up with vascular as outpatient   - Needs Follow up with primary care physician re: incidental CT chest findings (recommendations to Follow up in 3 months), I printed the report to the patient and highlighted with a pen the concern areas on the report, he understands confirms his plan for follow up .     Case discussed with resident assigned.   Patient made aware re: black box warning and major side effects associated with Novel Oral Anti-Coagulant (NOAC). All Qs answered

## 2023-08-01 NOTE — H&P ADULT - NSHPLABSRESULTS_GEN_ALL_CORE
Elevated liver enzymes Vital Signs Last 24 Hrs  T(C): 36.4 (31 Jul 2023 17:36), Max: 36.4 (31 Jul 2023 17:36)  T(F): 97.5 (31 Jul 2023 17:36), Max: 97.5 (31 Jul 2023 17:36)  HR: 73 (01 Aug 2023 02:20) (73 - 73)  BP: 166/98 (31 Jul 2023 17:36) (166/98 - 166/98)  BP(mean): --  RR: 18 (01 Aug 2023 02:20) (18 - 18)  SpO2: 97% (01 Aug 2023 02:20) (97% - 97%)    Parameters below as of 01 Aug 2023 02:20  Patient On (Oxygen Delivery Method): room air      CBC Full  -  ( 31 Jul 2023 19:53 )  WBC Count : 7.04 K/uL  RBC Count : 5.28 M/uL  Hemoglobin : 16.7 g/dL  Hematocrit : 50.1 %  Platelet Count - Automated : 203 K/uL  Mean Cell Volume : 94.9 fL  Mean Cell Hemoglobin : 31.6 pg  Mean Cell Hemoglobin Concentration : 33.3 g/dL  Auto Neutrophil # : 4.16 K/uL  Auto Lymphocyte # : 1.91 K/uL  Auto Monocyte # : 0.71 K/uL  Auto Eosinophil # : 0.19 K/uL  Auto Basophil # : 0.05 K/uL  Auto Neutrophil % : 59.1 %  Auto Lymphocyte % : 27.1 %  Auto Monocyte % : 10.1 %  Auto Eosinophil % : 2.7 %  Auto Basophil % : 0.7 %    07-31    140  |  103  |  20  ----------------------------<  72  5.6<H>   |  28  |  1.2    Ca    10.2      31 Jul 2023 19:53  Mg     2.0     07-31    TPro  7.4  /  Alb  4.8  /  TBili  0.7  /  DBili  x   /  AST  27  /  ALT  22  /  AlkPhos  90  07-31    LIVER FUNCTIONS - ( 31 Jul 2023 19:53 )  Alb: 4.8 g/dL / Pro: 7.4 g/dL / ALK PHOS: 90 U/L / ALT: 22 U/L / AST: 27 U/L / GGT: x           Urinalysis Basic - ( 31 Jul 2023 19:53 )    Color: x / Appearance: x / SG: x / pH: x  Gluc: 72 mg/dL / Ketone: x  / Bili: x / Urobili: x   Blood: x / Protein: x / Nitrite: x   Leuk Esterase: x / RBC: x / WBC x   Sq Epi: x / Non Sq Epi: x / Bacteria: x Dry cough Hyperthyroidism

## 2023-08-01 NOTE — H&P ADULT - HISTORY OF PRESENT ILLNESS
77-year-old male with a past medical history of hypertension, hyperlipidemia, BPH, and depression who was sent in by his cardiologist here for possible DVT in the left leg.  Reports that he has been having pain in his left leg for the past few months along with shortness of breath since a few weeks ago. Pt saw cardiologist, noted leg swelling, had duplex performed in office with concern for DVT and was recommended to come in for further evaluation.  Denies chest pain, nausea, vomiting, abdominal pain, urinary symptoms, change with bowel movement.  Denies recent trauma, immobilization, surgery, hospitalization, history of blood clot/cancer.    In ED, VS significant for /98. Labs trop <0.01, BNP 99, K 5.6. CT angio PE showed segmental PE bilateral lower lobes with concern for R heart strain. pre-lund LE duplex showed L comm fem to popliteal DVT. Crit contacted, no need for perc team. Vascular eval to follow for thrombectomy.     77-year-old male with a past medical history of hypertension, hyperlipidemia, BPH, and depression who was sent in by his cardiologist here for possible DVT in the left leg.  Reports that he has been having pain in his left leg for the past few months along with shortness of breath since a few weeks ago. Pt saw cardiologist, noted leg swelling, had duplex performed in office with concern for DVT and was recommended to come in for further evaluation.  Denies chest pain, nausea, vomiting, abdominal pain, urinary symptoms, change with bowel movement.  Denies recent trauma, immobilization, surgery, hospitalization, history of blood clot/cancer.    In ED, VS significant for /98. Labs trop <0.01, BNP 99, K 5.6. EKG S1Q3T3. CT angio PE showed segmental PE bilateral lower lobes with concern for R heart strain. pre-lund LE duplex showed L comm fem to popliteal DVT. Crit contacted, no need for perc team. Vascular eval to follow for thrombectomy.

## 2023-08-01 NOTE — H&P ADULT - ASSESSMENT
77-year-old male with a past medical history of hypertension, hyperlipidemia, BPH, and depression admitted for DVT and PE    #Bilateral Segmental lower lobe pulmonary emboli  #CT evidence of L heart strain  #Suspected L comm fem and popliteal deep vein thrombosis  -c/w heparin gtt  -no PERC per ICU  -vascular eval for thrombectomy  -pt asking to eat, no plan as of admission, will feed pt breakfast  -f/u vascular for definitive plan    #Hyperkalemia, medication induced  -pt supplements 30mEq daily of potassium for kidney stone prevention  -K 5.6 on admission  -hold and re-evaluate, may need to hold ARB if remains elevated  -reduce dose on DC    #Essential Hypertension  #Hyperlipidemia  -c/w home medications with therapeutic interchanges: olmesartan to losartan, rosuva to atorva    #Depression  -non-formulary to vilazodone    #Diet  -dash    #Activity  -IAT    #DVT ppx  -hep gtt    #Dispo  -24hr pending vascular eval, transition to PO AC 77-year-old male with a past medical history of hypertension, hyperlipidemia, BPH, and depression admitted for DVT and PE    #Bilateral Segmental lower lobe pulmonary emboli  #CT evidence of L heart strain  #Suspected L comm fem and popliteal deep vein thrombosis  -c/w heparin gtt  -no PERC per ICU  -vascular eval for thrombectomy  -pt asking to eat, no plan as of admission, will feed pt breakfast  -f/u vascular for definitive plan    #Hyperkalemia, medication induced  -pt supplements 30mEq daily of potassium for kidney stone prevention  -K 5.6 on admission  -hold and re-evaluate, may need to hold ARB if remains elevated  -reduce PO potassium dose on DC    #Essential Hypertension  #Hyperlipidemia  -c/w home medications with therapeutic interchanges: olmesartan to losartan, rosuva to atorva    #Depression  -non-formulary to vilazodone    #Diet  -dash    #Activity  -IAT    #DVT ppx  -hep gtt    #Dispo  -24hr pending vascular eval, transition to PO AC

## 2023-08-01 NOTE — CHART NOTE - NSCHARTNOTEFT_GEN_A_CORE
Pt seen and re-examined  No leg swelling, no SOB    Plan:  - no need for any procedures on this admission  - start po AC with Eliquis 10 mg po BID x 7 days then 5 mg po BID  - follow up with vascular surgery as outpt in 2-3 weeks    SPECTRA [id number]

## 2023-08-01 NOTE — CONSULT NOTE ADULT - ASSESSMENT
ASSESSMENT:  78 y/o male with PMHx of HTN, HLD, BPH, depression was sent to ED by his cardiologist due to concern for LLE DVT. Patient reports he has seen the cardiologist Dr Maldonado due to worsening SOB over the last 3 months. He had a follow up today in the office and was noted to have LLE swelling. An US done showed LLE DVT and he was sent to ED for further evaluation. Patient reports he has had LLE swelling for over 3 months. repeat venous duplex in the ED shows LLE DVT extending from common femoral vein to popliteal vein (unnoficial read). CT chest shows b/l segmental PEs with evidence of right heart strain     PLAN:  heparin drip, monitor ptt   follow up official read on venous duplex   attending to review the imaging in AM ASSESSMENT:  76 y/o male with PMHx of HTN, HLD, BPH, depression was sent to ED by his cardiologist due to concern for LLE DVT. Patient reports he has seen the cardiologist Dr Maldonado due to worsening SOB over the last 3 months. He had a follow up today in the office and was noted to have LLE swelling. An US done showed LLE DVT and he was sent to ED for further evaluation. Patient reports he has had LLE swelling for over 3 months. repeat venous duplex in the ED shows LLE DVT extending from common femoral vein to popliteal vein (unnoficial read). CT chest shows b/l segmental PEs with evidence of right heart strain     PLAN:  heparin drip, monitor ptt   follow up official read on venous duplex   IR consult for management of PE with right heart strain   attending to review the imaging in AM    plan discussed with vascular fellow Dr Chou

## 2023-08-02 ENCOUNTER — TRANSCRIPTION ENCOUNTER (OUTPATIENT)
Age: 78
End: 2023-08-02

## 2023-08-02 VITALS
OXYGEN SATURATION: 98 % | DIASTOLIC BLOOD PRESSURE: 93 MMHG | SYSTOLIC BLOOD PRESSURE: 135 MMHG | TEMPERATURE: 98 F | RESPIRATION RATE: 18 BRPM | HEART RATE: 74 BPM

## 2023-08-02 DIAGNOSIS — R91.8 OTHER NONSPECIFIC ABNORMAL FINDING OF LUNG FIELD: ICD-10-CM

## 2023-08-02 LAB
ANION GAP SERPL CALC-SCNC: 10 MMOL/L — SIGNIFICANT CHANGE UP (ref 7–14)
BASOPHILS # BLD AUTO: 0.05 K/UL — SIGNIFICANT CHANGE UP (ref 0–0.2)
BASOPHILS NFR BLD AUTO: 0.7 % — SIGNIFICANT CHANGE UP (ref 0–1)
BUN SERPL-MCNC: 15 MG/DL — SIGNIFICANT CHANGE UP (ref 10–20)
CALCIUM SERPL-MCNC: 9.8 MG/DL — SIGNIFICANT CHANGE UP (ref 8.4–10.4)
CHLORIDE SERPL-SCNC: 102 MMOL/L — SIGNIFICANT CHANGE UP (ref 98–110)
CO2 SERPL-SCNC: 29 MMOL/L — SIGNIFICANT CHANGE UP (ref 17–32)
CREAT SERPL-MCNC: 1.1 MG/DL — SIGNIFICANT CHANGE UP (ref 0.7–1.5)
EGFR: 70 ML/MIN/1.73M2 — SIGNIFICANT CHANGE UP
EOSINOPHIL # BLD AUTO: 0.28 K/UL — SIGNIFICANT CHANGE UP (ref 0–0.7)
EOSINOPHIL NFR BLD AUTO: 4.2 % — SIGNIFICANT CHANGE UP (ref 0–8)
GLUCOSE SERPL-MCNC: 91 MG/DL — SIGNIFICANT CHANGE UP (ref 70–99)
HCT VFR BLD CALC: 50.6 % — SIGNIFICANT CHANGE UP (ref 42–52)
HCV AB S/CO SERPL IA: 0.04 COI — SIGNIFICANT CHANGE UP
HCV AB SERPL-IMP: SIGNIFICANT CHANGE UP
HGB BLD-MCNC: 16.8 G/DL — SIGNIFICANT CHANGE UP (ref 14–18)
IMM GRANULOCYTES NFR BLD AUTO: 0.1 % — SIGNIFICANT CHANGE UP (ref 0.1–0.3)
LYMPHOCYTES # BLD AUTO: 2.16 K/UL — SIGNIFICANT CHANGE UP (ref 1.2–3.4)
LYMPHOCYTES # BLD AUTO: 32.1 % — SIGNIFICANT CHANGE UP (ref 20.5–51.1)
MAGNESIUM SERPL-MCNC: 1.9 MG/DL — SIGNIFICANT CHANGE UP (ref 1.8–2.4)
MCHC RBC-ENTMCNC: 31.9 PG — HIGH (ref 27–31)
MCHC RBC-ENTMCNC: 33.2 G/DL — SIGNIFICANT CHANGE UP (ref 32–37)
MCV RBC AUTO: 96 FL — HIGH (ref 80–94)
MONOCYTES # BLD AUTO: 0.69 K/UL — HIGH (ref 0.1–0.6)
MONOCYTES NFR BLD AUTO: 10.3 % — HIGH (ref 1.7–9.3)
NEUTROPHILS # BLD AUTO: 3.53 K/UL — SIGNIFICANT CHANGE UP (ref 1.4–6.5)
NEUTROPHILS NFR BLD AUTO: 52.6 % — SIGNIFICANT CHANGE UP (ref 42.2–75.2)
NRBC # BLD: 0 /100 WBCS — SIGNIFICANT CHANGE UP (ref 0–0)
PLATELET # BLD AUTO: 186 K/UL — SIGNIFICANT CHANGE UP (ref 130–400)
PMV BLD: 10.6 FL — HIGH (ref 7.4–10.4)
POTASSIUM SERPL-MCNC: 4.7 MMOL/L — SIGNIFICANT CHANGE UP (ref 3.5–5)
POTASSIUM SERPL-SCNC: 4.7 MMOL/L — SIGNIFICANT CHANGE UP (ref 3.5–5)
RBC # BLD: 5.27 M/UL — SIGNIFICANT CHANGE UP (ref 4.7–6.1)
RBC # FLD: 14.2 % — SIGNIFICANT CHANGE UP (ref 11.5–14.5)
SODIUM SERPL-SCNC: 141 MMOL/L — SIGNIFICANT CHANGE UP (ref 135–146)
WBC # BLD: 6.72 K/UL — SIGNIFICANT CHANGE UP (ref 4.8–10.8)
WBC # FLD AUTO: 6.72 K/UL — SIGNIFICANT CHANGE UP (ref 4.8–10.8)

## 2023-08-02 PROCEDURE — 99222 1ST HOSP IP/OBS MODERATE 55: CPT

## 2023-08-02 PROCEDURE — 99239 HOSP IP/OBS DSCHRG MGMT >30: CPT

## 2023-08-02 RX ORDER — APIXABAN 2.5 MG/1
2 TABLET, FILM COATED ORAL
Qty: 74 | Refills: 0
Start: 2023-08-02 | End: 2023-08-08

## 2023-08-02 RX ORDER — APIXABAN 2.5 MG/1
2 TABLET, FILM COATED ORAL
Qty: 60 | Refills: 0
Start: 2023-08-02 | End: 2023-08-08

## 2023-08-02 RX ADMIN — LOSARTAN POTASSIUM 100 MILLIGRAM(S): 100 TABLET, FILM COATED ORAL at 05:24

## 2023-08-02 RX ADMIN — ENOXAPARIN SODIUM 80 MILLIGRAM(S): 100 INJECTION SUBCUTANEOUS at 05:24

## 2023-08-02 RX ADMIN — Medication 650 MILLIGRAM(S): at 05:23

## 2023-08-02 RX ADMIN — AMLODIPINE BESYLATE 5 MILLIGRAM(S): 2.5 TABLET ORAL at 05:24

## 2023-08-02 NOTE — DISCHARGE NOTE PROVIDER - NSDCMRMEDTOKEN_GEN_ALL_CORE_FT
amLODIPine 5 mg oral tablet: 1 tab(s) orally once a day  Eliquis Starter Pack for Treatment of DVT and PE 5 mg oral tablet: 2 tab(s) orally 2 times a day After the initial 7 days, please take ONE tablet twice daily. Do  finasteride 5 mg oral tablet: 1 tab(s) orally once a day  olmesartan 40 mg oral tablet: 1 tab(s) orally once a day  potassium citrate 15 mEq oral tablet, extended release: 1 tab(s) orally 2 times a day  rosuvastatin 10 mg oral capsule: 1 tab(s) orally once a day  tamsulosin 0.4 mg oral capsule: 1 tab(s) orally once a day  vilazodone 40 mg oral tablet: 1 tab(s) orally once a day

## 2023-08-02 NOTE — DISCHARGE NOTE PROVIDER - NSDCFUSCHEDAPPT_GEN_ALL_CORE_FT
Cas Angela  [facility]  P[facility] Joy Abdullahi  Scheduled Appointment: 10/02/2023

## 2023-08-02 NOTE — DISCHARGE NOTE PROVIDER - CARE PROVIDER_API CALL
Claribel Adorno Janine [facility]  Vascular Surgery  [address] [place] [address], [address]  [place] [address]  Phone: [phone]  Fax: [phone]  Follow Up Time: 2 weeks

## 2023-08-02 NOTE — PROGRESS NOTE ADULT - SUBJECTIVE AND OBJECTIVE BOX
ROYAL ISELA  77y, Male  Allergy: No Known Allergies    Hospital Day: 1d    Patient seen and examined earlier today.       LAST 24-Hr EVENTS:  no events overnight   ambulating without issues on room air   reporting feeling "much better"     VITALS:  T(F): 97.8 (08-02-23 @ 08:01), Max: 98 (08-01-23 @ 23:34)  HR: 74 (08-02-23 @ 08:01)  BP: 135/93 (08-02-23 @ 08:01) (135/93 - 148/100)  RR: 18 (08-02-23 @ 08:01)  SpO2: 98% (08-02-23 @ 08:01)      TESTS & MEASUREMENTS:  Weight/Height/BMI    Weight (kg): 93.9 (07-31-23 @ 17:36)    08-01-23 @ 07:01  -  08-02-23 @ 07:00  --------------------------------------------------------  IN: 0 mL / OUT: 0 mL / NET: 0 mL                            16.8   6.72  )-----------( 186      ( 02 Aug 2023 05:48 )             50.6     PT/INR - ( 31 Jul 2023 19:53 )   PT: 11.30 sec;   INR: 0.99 ratio         PTT - ( 01 Aug 2023 08:10 )  PTT:175.7 sec  INR: 0.99 ratio (07-31-23 @ 19:53)    08-02    141  |  102  |  15  ----------------------------<  91  4.7   |  29  |  1.1    Ca    9.8      02 Aug 2023 05:48  Mg     1.9     08-02    TPro  7.2  /  Alb  4.6  /  TBili  1.0  /  DBili  x   /  AST  25  /  ALT  21  /  AlkPhos  94  08-01    LIVER FUNCTIONS - ( 01 Aug 2023 06:05 )  Alb: 4.6 g/dL / Pro: 7.2 g/dL / ALK PHOS: 94 U/L / ALT: 21 U/L / AST: 25 U/L / GGT: x           CARDIAC MARKERS ( 01 Aug 2023 11:47 )  x     / <0.01 ng/mL / x     / x     / x      CARDIAC MARKERS ( 01 Aug 2023 06:05 )  x     / <0.01 ng/mL / x     / x     / x      CARDIAC MARKERS ( 31 Jul 2023 19:53 )  x     / <0.01 ng/mL / x     / x     / x            Urinalysis Basic - ( 02 Aug 2023 05:48 )    Color: x / Appearance: x / SG: x / pH: x  Gluc: 91 mg/dL / Ketone: x  / Bili: x / Urobili: x   Blood: x / Protein: x / Nitrite: x   Leuk Esterase: x / RBC: x / WBC x   Sq Epi: x / Non Sq Epi: x / Bacteria: x      Confirmed by Mani Ingram ([id number]) on 8/1/2023 5:12:33 PM (08-01-23 @ 11:02)    < from: TTE Echo Complete w/o Contrast w/ Doppler (08.01.23 @ 06:52) >  Summary:   1. Left ventricular ejection fraction, by visual estimation, is 50 to   55%.   2. Normal global left ventricular systolic function.   3. Moderately enlarged right ventricle.   4. Trace mitral valve regurgitation.   5. Normal left atrial size.   6. Normal right atrial size.   7. Dilatation of the ascending aorta.        MEDICATIONS:  MEDICATIONS  (STANDING):  amLODIPine   Tablet 5 milliGRAM(s) Oral daily  atorvastatin 40 milliGRAM(s) Oral at bedtime  enoxaparin Injectable 80 milliGRAM(s) SubCutaneous every 12 hours  finasteride 5 milliGRAM(s) Oral daily  losartan 100 milliGRAM(s) Oral daily  tamsulosin 0.4 milliGRAM(s) Oral at bedtime    MEDICATIONS  (PRN):  acetaminophen     Tablet .. 650 milliGRAM(s) Oral every 6 hours PRN Temp greater or equal to 38C (100.4F), Mild Pain (1 - 3)  melatonin 3 milliGRAM(s) Oral at bedtime PRN for insomnia      HOME MEDICATIONS (as per chart review):  amLODIPine 5 mg oral tablet (08-01)  finasteride 5 mg oral tablet (08-01)  olmesartan 40 mg oral tablet (08-01)  potassium citrate 15 mEq oral tablet, extended release (08-01)  rosuvastatin 10 mg oral capsule (08-01)  tamsulosin 0.4 mg oral capsule (08-01)  vilazodone 40 mg oral tablet (08-01)      PHYSICAL EXAM:  GENERAL: pleasant and in NAD/P   CHEST/LUNG: Clear to auscultation bilaterally   HEART: S1S2  ABDOMEN: Soft  EXTREMITIES:  left LE edema, pulse +

## 2023-08-02 NOTE — DISCHARGE NOTE NURSING/CASE MANAGEMENT/SOCIAL WORK - NSDCPEFALRISK_GEN_ALL_CORE
[Father] : father
For information on Fall & Injury Prevention, visit: [email or web address] OR  [email or web address] OR  https://www.cdc.gov/steadi/patient.html

## 2023-08-02 NOTE — CONSULT NOTE ADULT - SUBJECTIVE AND OBJECTIVE BOX
GENERAL SURGERY CONSULT NOTE    Patient: ISELA PAIGE , 77y ([birth year])Male   MRN: [id number]  Location: [facility] ED Hold 023 A  Visit: 08-01-23 Inpatient  Date: 08-01-23 @ 02:44    HPI:  76 y/o male with PMHx of HTN, HLD, BPH, depression was sent to ED by his cardiologist due to concern for LLE DVT. Patient reports he has seen the cardiologist Dr Maldonado due to worsening SOB over the last 3 months. He had a follow up today in the office and was noted to have LLE swelling. An US done showed LLE DVT and he was sent to ED for further evaluation. Patient reports he has had LLE swelling for over 3 months. Denies any LLE pain, Denies any history of DVT, recent travel, hospitalization, blood disorders.    PAST MEDICAL & SURGICAL HISTORY:  HTN (hypertension)      High cholesterol      BPH (benign prostatic hyperplasia)      Kidney stones      Depression      History of tonsillectomy      H/O lithotripsy          Home Medications:  Benicar 40 mg oral tablet: 1 tab(s) orally once a day (17 May 2019 10:48)  Crestor 10 mg oral tablet: 1 tab(s) orally once a day (17 May 2019 10:48)  Ecotrin Adult Low Strength 81 mg oral delayed release tablet: 1 tab(s) orally once a day (17 May 2019 10:48)  finasteride 5 mg oral tablet: 1 tab(s) orally once a day (17 May 2019 10:48)  Multiple Vitamins oral capsule: 1 cap(s) orally once a day (17 May 2019 10:48)  potassium: orally 2 times a day (17 May 2019 10:48)  tamsulosin 0.4 mg oral capsule: 1 cap(s) orally once a day (17 May 2019 10:48)  Viibryd 40 mg oral tablet: 1 tab(s) orally once a day (17 May 2019 10:48)        VITALS:  T(F): 97.5 (07-31-23 @ 17:36), Max: 97.5 (07-31-23 @ 17:36)  HR: 73 (08-01-23 @ 02:20) (73 - 73)  BP: 166/98 (07-31-23 @ 17:36) (166/98 - 166/98)  RR: 18 (08-01-23 @ 02:20) (18 - 18)  SpO2: 97% (08-01-23 @ 02:20) (97% - 97%)    PHYSICAL EXAM:  General: NAD, AAOx3,   Cardiac: RRR S1, S2,   Respiratory: CTAB,   Abdomen: Soft, non-distended, non-tender,  Extremities: Mild swelling of the LLE. no tenderness to palpation, no color changes to LLE. good motor and sensation, palpable distal pulses. RLE wnl    MEDICATIONS  (STANDING):  heparin  Infusion.  Unit(s)/Hr (17 mL/Hr) IV Continuous <Continuous>  losartan 100 milliGRAM(s) Oral daily    MEDICATIONS  (PRN):  heparin   Injectable 7500 Unit(s) IV Push every 6 hours PRN For aPTT less than 40  heparin   Injectable 3500 Unit(s) IV Push every 6 hours PRN For aPTT between 40 - 57      LAB/STUDIES:                        16.7   7.04  )-----------( 203      ( 31 Jul 2023 19:53 )             50.1     07-31    140  |  103  |  20  ----------------------------<  72  5.6<H>   |  28  |  1.2    Ca    10.2      31 Jul 2023 19:53  Mg     2.0     07-31    TPro  7.4  /  Alb  4.8  /  TBili  0.7  /  DBili  x   /  AST  27  /  ALT  22  /  AlkPhos  90  07-31    PT/INR - ( 31 Jul 2023 19:53 )   PT: 11.30 sec;   INR: 0.99 ratio         PTT - ( 31 Jul 2023 19:53 )  PTT:30.5 sec  LIVER FUNCTIONS - ( 31 Jul 2023 19:53 )  Alb: 4.8 g/dL / Pro: 7.4 g/dL / ALK PHOS: 90 U/L / ALT: 22 U/L / AST: 27 U/L / GGT: x           Urinalysis Basic - ( 31 Jul 2023 19:53 )    Color: x / Appearance: x / SG: x / pH: x  Gluc: 72 mg/dL / Ketone: x  / Bili: x / Urobili: x   Blood: x / Protein: x / Nitrite: x   Leuk Esterase: x / RBC: x / WBC x   Sq Epi: x / Non Sq Epi: x / Bacteria: x      CARDIAC MARKERS ( 31 Jul 2023 19:53 )  x     / <0.01 ng/mL / x     / x     / x            IMAGING:  < from: CT Angio Chest PE Protocol w/ IV Cont (08.01.23 @ 00:49) >  IMPRESSION:    Segmental pulmonary emboli to the bilateral lower lobes.  CT evidence for right heart strain.    3 month chest CT follow-up is recommended to reevaluate a 2 cm left lower   lobe rounded opacity.    Finding of pulmonary embolism discussed with Verito Banks at 1:22 AM    < end of copied text >      ACCESS DEVICES:  [ ] Peripheral IV  [ ] Central Venous Line	[ ] R	[ ] L	[ ] IJ	[ ] Fem	[ ] SC	Placed:   [ ] Arterial Line		[ ] R	[ ] L	[ ] Fem	[ ] Rad	[ ] Ax	Placed:   [ ] PICC:					[ ] Mediport  [ ] Urinary Catheter, Date Placed:     
INTERVENTIONAL RADIOLOGY CONSULT:     Procedure Requested:     HPI:  77-year-old male with a past medical history of hypertension, hyperlipidemia, BPH, and depression who was sent in by his cardiologist here for possible DVT in the left leg.  Reports that he has been having pain in his left leg for the past few months along with shortness of breath since a few weeks ago. Pt saw cardiologist, noted leg swelling, had duplex performed in office with concern for DVT and was recommended to come in for further evaluation.  Denies chest pain, nausea, vomiting, abdominal pain, urinary symptoms, change with bowel movement.  Denies recent trauma, immobilization, surgery, hospitalization, history of blood clot/cancer.    In ED, VS significant for /98. Labs trop <0.01, BNP 99, K 5.6. EKG S1Q3T3. CT angio PE showed segmental PE bilateral lower lobes with concern for R heart strain. pre-lund LE duplex showed L comm fem to popliteal DVT. Crit contacted, no need for perc team. Vascular eval to follow for thrombectomy.     (01 Aug 2023 04:14)      PAST MEDICAL & SURGICAL HISTORY:  HTN (hypertension)      High cholesterol      BPH (benign prostatic hyperplasia)      Kidney stones      Depression      History of tonsillectomy      H/O lithotripsy          MEDICATIONS  (STANDING):  amLODIPine   Tablet 5 milliGRAM(s) Oral daily  atorvastatin 40 milliGRAM(s) Oral at bedtime  finasteride 5 milliGRAM(s) Oral daily  heparin  Infusion.  Unit(s)/Hr (17 mL/Hr) IV Continuous <Continuous>  losartan 100 milliGRAM(s) Oral daily  tamsulosin 0.4 milliGRAM(s) Oral at bedtime    MEDICATIONS  (PRN):  acetaminophen     Tablet .. 650 milliGRAM(s) Oral every 6 hours PRN Temp greater or equal to 38C (100.4F), Mild Pain (1 - 3)  heparin   Injectable 7500 Unit(s) IV Push every 6 hours PRN For aPTT less than 40  heparin   Injectable 3500 Unit(s) IV Push every 6 hours PRN For aPTT between 40 - 57      Allergies    No Known Allergies    Intolerances        Social History:   Smoking: Yes [ ]  No [ ]   ______pk yrs  ETOH  Yes [ ]  No [ ]  Social [ ]  DRUGS:  Yes [ ]  No [ ]  if so what______________    FAMILY HISTORY:  FH: prostate cancer        Physical Exam:   Vital Signs Last 24 Hrs  T(C): 36.6 (01 Aug 2023 07:57), Max: 36.6 (01 Aug 2023 07:57)  T(F): 97.8 (01 Aug 2023 07:57), Max: 97.8 (01 Aug 2023 07:57)  HR: 69 (01 Aug 2023 07:57) (66 - 73)  BP: 170/95 (01 Aug 2023 07:57) (147/87 - 170/95)  BP(mean): --  RR: 18 (01 Aug 2023 07:57) (18 - 18)  SpO2: 99% (01 Aug 2023 07:57) (97% - 99%)    Parameters below as of 01 Aug 2023 07:57  Patient On (Oxygen Delivery Method): room air      Labs:                         17.4   7.72  )-----------( 190      ( 01 Aug 2023 08:10 )             50.8     08-01    139  |  102  |  16  ----------------------------<  93  4.8   |  24  |  1.1    Ca    10.2      01 Aug 2023 06:05  Mg     1.9     08-01    TPro  7.2  /  Alb  4.6  /  TBili  1.0  /  DBili  x   /  AST  25  /  ALT  21  /  AlkPhos  94  08-01    PT/INR - ( 31 Jul 2023 19:53 )   PT: 11.30 sec;   INR: 0.99 ratio         PTT - ( 31 Jul 2023 19:53 )  PTT:30.5 sec    Pertinent labs:                      17.4 7.72  )-----------( 190      ( 01 Aug 2023 08:10 )             50.8       08-01    139  |  102  |  16  ----------------------------<  93  4.8   |  24  |  1.1    Ca    10.2      01 Aug 2023 06:05  Mg     1.9     08-01    TPro  7.2  /  Alb  4.6  /  TBili  1.0  /  DBili  x   /  AST  25  /  ALT  21  /  AlkPhos  94  08-01      PT/INR - ( 31 Jul 2023 19:53 )   PT: 11.30 sec;   INR: 0.99 ratio         PTT - ( 31 Jul 2023 19:53 )  PTT:30.5 sec    Radiology & Additional Studies:     IMPRESSION:    Segmental pulmonary emboli to the bilateral lower lobes.  CT evidence for right heart strain.    3 month chest CT follow-up is recommended to reevaluate a 2 cm left lower   lobe rounded opacity.    Finding of pulmonary embolism discussed with Verito Banks at 1:22 AM    --- End of Report ---      Radiology imaging reviewed.       ASSESSMENT/ PLAN:   77-year-old male with a past medical history of hypertension, hyperlipidemia, BPH, and depression who was sent in by his cardiologist here for possible DVT in the left leg. Pt saw cardiologist, noted leg swelling, had duplex performed in office with concern for DVT and was recommended to come in for further evaluation.   In ED, VS significant for /98. Labs trop <0.01, BNP 99, K 5.6. EKG S1Q3T3. CT angio PE showed segmental PE bilateral lower lobes with concern for R heart strain. pre-lund LE duplex showed L comm fem to popliteal DVT. Crit contacted, no need for perc team. Vascular eval to follow for thrombectomy. Vascular recommended IR consult for PE management.   - labs WNL, trop and BNP negative  - patient HD stable, 02 sat 99% on RA  - not a candidate for thrombectomy at this time  - no IR intervention warranted, continue with conservative management         Thank you for the courtesy of this consult, please call [phone] with any further questions.   
Patient is a 77y old  Male who presents with a chief complaint of PE/DVT (01 Aug 2023 09:08)    77-year-old male with a past medical history of hypertension, hyperlipidemia, BPH, and depression who was sent in by his cardiologist here for possible DVT in the left leg.  Reports that he has been having pain in his left leg for the past few months along with shortness of breath since a few weeks ago. Pt saw cardiologist, noted leg swelling, had duplex performed in office with concern for DVT and was recommended to come in for further evaluation.  Denies chest pain, nausea, vomiting, abdominal pain, urinary symptoms, change with bowel movement.  Denies recent trauma, immobilization, surgery, hospitalization, history of blood clot/cancer.    In ED, VS significant for /98. Labs trop <0.01, BNP 99, K 5.6. EKG S1Q3T3. CT angio PE showed segmental PE bilateral lower lobes with concern for R heart strain. pre-lund LE duplex showed L comm fem to popliteal DVT. Crit contacted, no need for perc team. Vascular eval to follow for thrombectomy.  Admitted to SDU, called to evaluate, patient on RA, reports trauma left leg 2 y ago, no weight loss, updated with Ca screening      PAST MEDICAL & SURGICAL HISTORY:  HTN (hypertension)      High cholesterol      BPH (benign prostatic hyperplasia)      Kidney stones      Depression      History of tonsillectomy      H/O lithotripsy          SOCIAL HX:   Smoking -    FAMILY HISTORY:  FH: prostate cancer        REVIEW OF SYSTEMS	 see hpi    Allergies    No Known Allergies    Intolerances        acetaminophen     Tablet .. 650 milliGRAM(s) Oral every 6 hours PRN  amLODIPine   Tablet 5 milliGRAM(s) Oral daily  atorvastatin 40 milliGRAM(s) Oral at bedtime  enoxaparin Injectable 80 milliGRAM(s) SubCutaneous every 12 hours  finasteride 5 milliGRAM(s) Oral daily  losartan 100 milliGRAM(s) Oral daily  melatonin 3 milliGRAM(s) Oral at bedtime PRN  tamsulosin 0.4 milliGRAM(s) Oral at bedtime  : Home Meds:      PHYSICAL EXAM    ICU Vital Signs Last 24 Hrs  T(C): 36.7 (01 Aug 2023 23:34), Max: 36.7 (01 Aug 2023 23:34)  T(F): 98 (01 Aug 2023 23:34), Max: 98 (01 Aug 2023 23:34)  HR: 72 (01 Aug 2023 23:34) (69 - 82)  BP: 138/84 (01 Aug 2023 23:34) (138/84 - 170/95)  RR: 17 (01 Aug 2023 23:34) (16 - 18)  SpO2: 96% (01 Aug 2023 23:34) (96% - 99%)    O2 Parameters below as of 01 Aug 2023 23:34  Patient On (Oxygen Delivery Method): room air            General: comfortable  HEENT:  SONAM              Lymph Nodes: No cervical LN   Lungs: Bilateral BS  Cardiovascular: Regular  Abdomen: Soft, Positive BS  Extremities: No clubbing  LLE swelling      08-01-23 @ 07:01  -  08-02-23 @ 06:41  --------------------------------------------------------  IN:  Total IN: 0 mL    OUT:    Heparin Infusion: 0 mL  Total OUT: 0 mL    Total NET: 0 mL          LABS:                          17.4   7.72  )-----------( 190      ( 01 Aug 2023 08:10 )             50.8                                               08-01    141  |  104  |  15  ----------------------------<  94  4.9   |  25  |  1.1    Ca    10.2      01 Aug 2023 08:10  Mg     1.9     08-01    TPro  7.2  /  Alb  4.6  /  TBili  1.0  /  DBili  x   /  AST  25  /  ALT  21  /  AlkPhos  94  08-01      PT/INR - ( 31 Jul 2023 19:53 )   PT: 11.30 sec;   INR: 0.99 ratio         PTT - ( 01 Aug 2023 08:10 )  PTT:175.7 sec                                       Urinalysis Basic - ( 01 Aug 2023 08:10 )    Color: x / Appearance: x / SG: x / pH: x  Gluc: 94 mg/dL / Ketone: x  / Bili: x / Urobili: x   Blood: x / Protein: x / Nitrite: x   Leuk Esterase: x / RBC: x / WBC x   Sq Epi: x / Non Sq Epi: x / Bacteria: x        CARDIAC MARKERS ( 01 Aug 2023 11:47 )  x     / <0.01 ng/mL / x     / x     / x      CARDIAC MARKERS ( 01 Aug 2023 06:05 )  x     / <0.01 ng/mL / x     / x     / x      CARDIAC MARKERS ( 31 Jul 2023 19:53 )  x     / <0.01 ng/mL / x     / x     / x                                                LIVER FUNCTIONS - ( 01 Aug 2023 06:05 )  Alb: 4.6 g/dL / Pro: 7.2 g/dL / ALK PHOS: 94 U/L / ALT: 21 U/L / AST: 25 U/L / GGT: x                                                                                                                                         MEDICATIONS  (STANDING):  amLODIPine   Tablet 5 milliGRAM(s) Oral daily  atorvastatin 40 milliGRAM(s) Oral at bedtime  enoxaparin Injectable 80 milliGRAM(s) SubCutaneous every 12 hours  finasteride 5 milliGRAM(s) Oral daily  losartan 100 milliGRAM(s) Oral daily  tamsulosin 0.4 milliGRAM(s) Oral at bedtime    MEDICATIONS  (PRN):  acetaminophen     Tablet .. 650 milliGRAM(s) Oral every 6 hours PRN Temp greater or equal to 38C (100.4F), Mild Pain (1 - 3)  melatonin 3 milliGRAM(s) Oral at bedtime PRN for insomnia

## 2023-08-02 NOTE — PROGRESS NOTE ADULT - ASSESSMENT
- bilateral subsegmental PEs and Left DVT in a patient known with chronic bilateral venous stasis and chronic LE edema   - Left LE DVT, no evidence of post phlebitic syndrome on physical exam (remains with no paresthesias, skin color changes, or diminished/absent peripheral pulse)   - HLD/HTN/BPH on therapy     PLAN  - Need to send Eliquis script to patient's pharmacy for cost inquiry today   - Patient educated re: Eliquis side effect and printed material   - Needs Follow up with vascular as outpatient   - Needs Follow up with primary care physician and pulmonary team re: incidental CT chest findings (recommendations to Follow up in 3 months), I printed the report to the patient and highlighted with a pen the concern areas on the report, he understands confirms his plan for follow up .     Case discussed with resident assigned.     TOTAL ENCOUNTER TIME:  35 mins (total discharge time including patient education, informational material, DC supervision and medication reconciliation, resident supervision, care coordination with outpatient pharmacy)

## 2023-08-02 NOTE — DISCHARGE NOTE PROVIDER - NSDCCPCAREPLAN_GEN_ALL_CORE_FT
PRINCIPAL DISCHARGE DIAGNOSIS  Diagnosis: Pulmonary embolism  Assessment and Plan of Treatment: You were diagnosed with blood clots in both lungs. Likely originating from the leg. You will need to be at least on 6-12 months of treatment with blood thinners. Make sure you follow-up with you primary care physician and lung doctor      SECONDARY DISCHARGE DIAGNOSES  Diagnosis: Left leg DVT  Assessment and Plan of Treatment: You were diagnosed with blood clots. You are at risk of recurrence with blood clots because of you long history of leg swelling. You will need to be at least on 6-12 months of treatment with blood thinners. make sure you follow-up with your vascular doctor/    Diagnosis: Abnormal CT scan of lung  Assessment and Plan of Treatment: You have some abnormalities in your chest CT scan (please refer to printed report provided to you on Aug 1st, or log in to Follow My Health). Please follow-up with the lung specialist as indicated as you need to repeat this test in 3-6 months.

## 2023-08-02 NOTE — PATIENT PROFILE ADULT - FALL HARM RISK - UNIVERSAL INTERVENTIONS
Bed in lowest position, wheels locked, appropriate side rails in place/Call bell, personal items and telephone in reach/Instruct patient to call for assistance before getting out of bed or chair/Non-slip footwear when patient is out of bed/[place] to call system/Physically safe environment - no spills, clutter or unnecessary equipment/Purposeful Proactive Rounding/Room/bathroom lighting operational, light cord in reach

## 2023-08-02 NOTE — CONSULT NOTE ADULT - ASSESSMENT
IMPRESSION:    Unprovoked DVT/ PE ( stable hemodynamically markers -) on RA  HTN/ DLD      PLAN:    CNS: Avoid CNS depressant    HEENT:  Oral care    PULMONARY:  HOB @ 45 degrees, RA ful AC    CARDIOVASCULAR: ECHO    GI: GI prophylaxis                                          Feeding PO    RENAL:  F/u  lytes.  Correct as needed. accurate I/O    INFECTIOUS DISEASE: NO ABX    HEMATOLOGICAL:  DVT tx, Eliquis on dc    ENDOCRINE:  Follow up FS.  Insulin protocol if needed.    floor

## 2023-08-02 NOTE — DISCHARGE NOTE PROVIDER - NSDCFUADDAPPT_GEN_ALL_CORE_FT
Please follow-up with your primary care physician (Dr Pardo) within one month and make sure you present him with this discharge document.

## 2023-08-02 NOTE — DISCHARGE NOTE PROVIDER - HOSPITAL COURSE
77-year-old male with a past medical history of hypertension, hyperlipidemia, BPH, and depression who was sent in by his cardiologist here for possible DVT in the left leg.  Reports that he has been having pain in his left leg for the past few months along with shortness of breath since a few weeks ago. Pt saw cardiologist, noted leg swelling, had duplex performed in office with concern for DVT and was recommended to come in for further evaluation.  Denies chest pain, nausea, vomiting, abdominal pain, urinary symptoms, change with bowel movement.  Denies recent trauma, immobilization, surgery, hospitalization, history of blood clot/cancer.    In ED, VS significant for /98. Labs trop <0.01, BNP 99, K 5.6. EKG S1Q3T3. CT angio PE showed segmental PE bilateral lower lobes with concern for R heart strain. pre-lund LE duplex showed L comm fem to popliteal DVT. Crit contacted, no need for perc team. Vascular eval to follow for thrombectomy.    Patient did well with Low-Molecular Weight Heparin (Lovenox) and his exertional dyspnea improved and was noted with SpO2>95% on RA after ambulation.     Patient was educated re: follow-up and anticoagulant management

## 2023-08-02 NOTE — DISCHARGE NOTE NURSING/CASE MANAGEMENT/SOCIAL WORK - PATIENT PORTAL LINK FT
You can access the FollowMyHealth Patient Portal offered by [facility] by registering at the following website: [email or web address]. By joining [employer]’s FollowMyHealth portal, you will also be able to view your health information using other applications (apps) compatible with our system.

## 2023-08-28 ENCOUNTER — APPOINTMENT (OUTPATIENT)
Dept: PULMONOLOGY | Facility: CLINIC | Age: 78
End: 2023-08-28
Payer: MEDICARE

## 2023-08-28 VITALS
DIASTOLIC BLOOD PRESSURE: 66 MMHG | HEART RATE: 89 BPM | BODY MASS INDEX: 27.83 KG/M2 | WEIGHT: 210 LBS | SYSTOLIC BLOOD PRESSURE: 110 MMHG | OXYGEN SATURATION: 97 % | HEIGHT: 73 IN

## 2023-08-28 DIAGNOSIS — Z78.9 OTHER SPECIFIED HEALTH STATUS: ICD-10-CM

## 2023-08-28 DIAGNOSIS — R06.02 SHORTNESS OF BREATH: ICD-10-CM

## 2023-08-28 DIAGNOSIS — N20.0 CALCULUS OF KIDNEY: ICD-10-CM

## 2023-08-28 DIAGNOSIS — Z80.42 FAMILY HISTORY OF MALIGNANT NEOPLASM OF PROSTATE: ICD-10-CM

## 2023-08-28 DIAGNOSIS — Z87.891 PERSONAL HISTORY OF NICOTINE DEPENDENCE: ICD-10-CM

## 2023-08-28 DIAGNOSIS — E78.00 PURE HYPERCHOLESTEROLEMIA, UNSPECIFIED: ICD-10-CM

## 2023-08-28 DIAGNOSIS — I26.09 OTHER PULMONARY EMBOLISM WITH ACUTE COR PULMONALE: ICD-10-CM

## 2023-08-28 DIAGNOSIS — Z84.1 FAMILY HISTORY OF DISORDERS OF KIDNEY AND URETER: ICD-10-CM

## 2023-08-28 DIAGNOSIS — I10 ESSENTIAL (PRIMARY) HYPERTENSION: ICD-10-CM

## 2023-08-28 PROCEDURE — 99214 OFFICE O/P EST MOD 30 MIN: CPT

## 2023-08-28 RX ORDER — APIXABAN 5 MG/1
TABLET, FILM COATED ORAL
Refills: 0 | Status: ACTIVE | COMMUNITY

## 2023-08-28 NOTE — DISCUSSION/SUMMARY
[FreeTextEntry1] : UNPROVOKED DVT/ PE ON ELIQUIS SOB ON EXERTION LLL NODULAR OPACITY REPEAT LE DOPPLER CT ANGIO IN 3 MONTH  ( 11/2023) CA SCREENING HEMOC EVAL HOSP RECORDS NOTED CT ANGIO IMAGES WERE REVIEWED BY MYSELF

## 2023-08-28 NOTE — PATIENT PROFILE ADULT - FUNCTIONAL ASSESSMENT - BASIC MOBILITY 4.
1) Return visit to [facility] for PORT removal.  2) Start Tamoxifen 2 weeks after completing radiation.  3) [facility] visit 4 weeks after starting Tamoxifen w/ labs (CBC, CMP, lipid panel).    King Dyson MD.   4 = No assist / stand by assistance

## 2023-08-28 NOTE — HISTORY OF PRESENT ILLNESS
[TextBox_4] : WAS SEEN IN HOSPITAL FOR SOB/ LEG SWELLING DIAGNOSED WITH DVT/ PE ON ELIQUIS SP  CHEST CT/ LE DOPPLER, STILL CO SOB ON EXERTION, NO PRIOR LUNG DISEASE NON SMOKER, UPDATED WITH CA SCREEENING

## 2023-09-11 NOTE — ED ADULT NURSE NOTE - HIV OFFER
35yM typically healthy comes in for eval of R testicular pain x4 days. States 4 days ago bumped his testicle against the desk when getting up, had mild discomfort but it got worse and persistent. No bruising/swelling. No dysuria, hematuria, penile discharge. No hx STIs, sexually active w/ 1 female partner. Opt out

## 2023-10-02 ENCOUNTER — APPOINTMENT (OUTPATIENT)
Dept: PULMONOLOGY | Facility: CLINIC | Age: 78
End: 2023-10-02
Payer: MEDICARE

## 2023-10-02 ENCOUNTER — LABORATORY RESULT (OUTPATIENT)
Age: 78
End: 2023-10-02

## 2023-10-02 ENCOUNTER — APPOINTMENT (OUTPATIENT)
Dept: PULMONOLOGY | Facility: CLINIC | Age: 78
End: 2023-10-02

## 2023-10-02 VITALS
WEIGHT: 207 LBS | RESPIRATION RATE: 12 BRPM | OXYGEN SATURATION: 96 % | HEART RATE: 68 BPM | HEIGHT: 73 IN | SYSTOLIC BLOOD PRESSURE: 140 MMHG | BODY MASS INDEX: 27.43 KG/M2 | DIASTOLIC BLOOD PRESSURE: 80 MMHG

## 2023-10-02 DIAGNOSIS — I26.99 OTHER PULMONARY EMBOLISM W/OUT ACUTE COR PULMONALE: ICD-10-CM

## 2023-10-02 PROCEDURE — 99213 OFFICE O/P EST LOW 20 MIN: CPT

## 2023-10-04 ENCOUNTER — TRANSCRIPTION ENCOUNTER (OUTPATIENT)
Age: 78
End: 2023-10-04

## 2023-10-04 PROBLEM — Z87.891 FORMER SMOKER: Status: ACTIVE | Noted: 2019-05-06

## 2023-10-04 PROBLEM — Z78.9 NON-SMOKER: Status: ACTIVE | Noted: 2023-10-04

## 2023-10-06 ENCOUNTER — RESULT REVIEW (OUTPATIENT)
Age: 78
End: 2023-10-06

## 2023-10-06 ENCOUNTER — OUTPATIENT (OUTPATIENT)
Dept: OUTPATIENT SERVICES | Facility: HOSPITAL | Age: 78
LOS: 1 days | End: 2023-10-06
Payer: MEDICARE

## 2023-10-06 DIAGNOSIS — Z98.890 OTHER SPECIFIED POSTPROCEDURAL STATES: Chronic | ICD-10-CM

## 2023-10-06 DIAGNOSIS — Z00.8 ENCOUNTER FOR OTHER GENERAL EXAMINATION: ICD-10-CM

## 2023-10-06 DIAGNOSIS — I26.99 OTHER PULMONARY EMBOLISM WITHOUT ACUTE COR PULMONALE: ICD-10-CM

## 2023-10-06 DIAGNOSIS — R91.1 SOLITARY PULMONARY NODULE: ICD-10-CM

## 2023-10-06 PROCEDURE — 71275 CT ANGIOGRAPHY CHEST: CPT

## 2023-10-06 PROCEDURE — 71275 CT ANGIOGRAPHY CHEST: CPT | Mod: 26

## 2023-10-07 DIAGNOSIS — I26.99 OTHER PULMONARY EMBOLISM WITHOUT ACUTE COR PULMONALE: ICD-10-CM

## 2023-10-07 DIAGNOSIS — R91.1 SOLITARY PULMONARY NODULE: ICD-10-CM

## 2023-12-14 ENCOUNTER — APPOINTMENT (OUTPATIENT)
Dept: VASCULAR SURGERY | Facility: CLINIC | Age: 78
End: 2023-12-14
Payer: MEDICARE

## 2023-12-14 VITALS — WEIGHT: 204 LBS | BODY MASS INDEX: 27.04 KG/M2 | HEIGHT: 73 IN

## 2023-12-14 DIAGNOSIS — M79.89 OTHER SPECIFIED SOFT TISSUE DISORDERS: ICD-10-CM

## 2023-12-14 PROCEDURE — 93970 EXTREMITY STUDY: CPT

## 2023-12-14 PROCEDURE — 99213 OFFICE O/P EST LOW 20 MIN: CPT

## 2023-12-14 NOTE — ASSESSMENT
[FreeTextEntry1] : 79 y/o M with LLE DVT and PE, on Xarelto since August 2023.  Venous duplex showed no DVT in the RLE and chronic DVT in the left LE.  Advised use of compression stockings daily during the day.  Follow up in six months time.   I, Dr. Cesario Mcguire, personally performed the evaluation and management (E/M) services for this established patient who presents today with (a) new problem(s)/exacerbation of (an) existing condition(s). That E/M includes conducting the clinically appropriate interval history &/or exam, assessing all new/exacerbated conditions, and establishing a new plan of care. Today, my LOWELL, Astrid Sidhu PA-C, was here to observe my evaluation and management service for this new problem/exacerbated condition and follow the plan of care established by me going forward.

## 2023-12-14 NOTE — DATA REVIEWED
[FreeTextEntry1] : I performed a venous duplex which was medically necessary to evaluate for DVT. It showed no DVT in the RLE and chronic DVT in the left LE.

## 2023-12-14 NOTE — HISTORY OF PRESENT ILLNESS
[FreeTextEntry1] : 77 y/o M with h/o LLE injury in 2020 from a fall, has had swelling since then, duplex done in 2020 showed no evidence of DVT, was seen by his Cardiologist in July and doppler at the time showed DVT and was sent to ED, CTA chest showed PE. He states that he drives to [place] every 3 or 4 months which is a 4 hour drive. No prior h/o DVT or PE.  He c/o leg heaviness and swelling after long periods of standing.

## 2024-01-09 ENCOUNTER — APPOINTMENT (OUTPATIENT)
Dept: PULMONOLOGY | Facility: CLINIC | Age: 79
End: 2024-01-09

## 2024-01-13 NOTE — PATIENT PROFILE ADULT - HOME ACCESSIBILITY CONCERNS
Emergency Department TeleTriage Encounter Note      CHIEF COMPLAINT    Chief Complaint   Patient presents with    Rectal Bleeding       VITAL SIGNS   Initial Vitals [01/13/24 1247]   BP Pulse Resp Temp SpO2   -- (!) 167 (!) 22 98 °F (36.7 °C) --      MAP       --            ALLERGIES    Review of patient's allergies indicates:   Allergen Reactions    Milk containing products (dairy)     Casein Nausea And Vomiting    [place] Rash       PROVIDER TRIAGE NOTE  Patient presents with bright red blood and mucus with bowel movement this morning. He had diarrhea without blood once before that. Subjective fever this morning, but mom gave ibuprofen because of teething. Baby is fussy.  He is on Amoxicillin for otitis media.       ORDERS  Labs Reviewed - No data to display    ED Orders (720h ago, onward)      None              Virtual Visit Note: The provider triage portion of this emergency department evaluation and documentation was performed via [employer], a HIPAA-compliant telemedicine application, in concert with a tele-presenter in the room. A face to face patient evaluation with one of my colleagues will occur once the patient is placed in an emergency department room.      DISCLAIMER: This note was prepared with [employer]*finalsite voice recognition transcription software. Garbled syntax, mangled pronouns, and other bizarre constructions may be attributed to that software system.     none

## 2024-06-13 ENCOUNTER — APPOINTMENT (OUTPATIENT)
Dept: VASCULAR SURGERY | Facility: CLINIC | Age: 79
End: 2024-06-13
Payer: MEDICARE

## 2024-06-13 VITALS — WEIGHT: 205 LBS | HEIGHT: 73 IN | BODY MASS INDEX: 27.17 KG/M2

## 2024-06-13 VITALS — DIASTOLIC BLOOD PRESSURE: 74 MMHG | SYSTOLIC BLOOD PRESSURE: 132 MMHG

## 2024-06-13 DIAGNOSIS — I82.409 ACUTE EMBOLISM AND THROMBOSIS OF UNSPECIFIED DEEP VEINS OF UNSPECIFIED LOWER EXTREMITY: ICD-10-CM

## 2024-06-13 PROCEDURE — 93970 EXTREMITY STUDY: CPT

## 2024-06-13 PROCEDURE — 99213 OFFICE O/P EST LOW 20 MIN: CPT

## 2024-06-13 NOTE — ASSESSMENT
[FreeTextEntry1] : 77 y/o M with LLE DVT and PE, completed 6 months of OAC in March has mild residual left calf swelling.  Duplex shows chronic DVT left femoral vein. Also complains of BL LE numbness and has palpable DP/PT pulses bilaterally.

## 2024-06-13 NOTE — DATA REVIEWED
[FreeTextEntry1] : I performed a venous duplex which was medically necessary to assess for DVT. It showed no acute DVT and chronic left femoral DVT.

## 2024-06-13 NOTE — HISTORY OF PRESENT ILLNESS
[FreeTextEntry1] : 79 y/o M with h/o LLE injury in 2020 from a fall, has had swelling since then, duplex done in 2020 showed no evidence of DVT, was seen by his Cardiologist in July and doppler at the time showed DVT and was sent to ED, CTA chest showed PE. He states that he drives to [place] every 3 or 4 months which is a 4 hour drive. No prior h/o DVT or PE.  Reports several months of bilateral knee pain and mild lower extremity numbness. Swelling is minimal, wear compression intermittently. Has bee off Eliquis since March.

## 2024-06-27 ENCOUNTER — TRANSCRIPTION ENCOUNTER (OUTPATIENT)
Age: 79
End: 2024-06-27

## 2024-10-14 ENCOUNTER — OUTPATIENT (OUTPATIENT)
Dept: OUTPATIENT SERVICES | Facility: HOSPITAL | Age: 79
LOS: 1 days | End: 2024-10-14
Payer: MEDICARE

## 2024-10-14 DIAGNOSIS — Z98.890 OTHER SPECIFIED POSTPROCEDURAL STATES: Chronic | ICD-10-CM

## 2024-10-14 DIAGNOSIS — N20.0 CALCULUS OF KIDNEY: ICD-10-CM

## 2024-10-14 PROCEDURE — 74019 RADEX ABDOMEN 2 VIEWS: CPT | Mod: 26

## 2024-10-14 PROCEDURE — 74019 RADEX ABDOMEN 2 VIEWS: CPT

## 2024-10-15 DIAGNOSIS — N20.0 CALCULUS OF KIDNEY: ICD-10-CM

## 2024-10-29 ENCOUNTER — APPOINTMENT (OUTPATIENT)
Dept: ORTHOPEDIC SURGERY | Facility: CLINIC | Age: 79
End: 2024-10-29

## 2024-10-29 DIAGNOSIS — M25.561 PAIN IN RIGHT KNEE: ICD-10-CM

## 2024-10-29 PROCEDURE — 73562 X-RAY EXAM OF KNEE 3: CPT | Mod: 50

## 2024-10-29 PROCEDURE — 20610 DRAIN/INJ JOINT/BURSA W/O US: CPT | Mod: RT

## 2024-10-29 PROCEDURE — 99203 OFFICE O/P NEW LOW 30 MIN: CPT | Mod: 25

## 2024-10-29 PROCEDURE — 72170 X-RAY EXAM OF PELVIS: CPT

## 2024-11-11 ENCOUNTER — RESULT REVIEW (OUTPATIENT)
Age: 79
End: 2024-11-11

## 2024-11-11 ENCOUNTER — OUTPATIENT (OUTPATIENT)
Dept: OUTPATIENT SERVICES | Facility: HOSPITAL | Age: 79
LOS: 1 days | End: 2024-11-11
Payer: MEDICARE

## 2024-11-11 ENCOUNTER — INPATIENT (INPATIENT)
Facility: HOSPITAL | Age: 79
LOS: 2 days | Discharge: HOME HEALTH | DRG: 176 | End: 2024-11-14
Attending: INTERNAL MEDICINE | Admitting: INTERNAL MEDICINE
Payer: MEDICARE

## 2024-11-11 VITALS
OXYGEN SATURATION: 97 % | TEMPERATURE: 98 F | HEIGHT: 72 IN | DIASTOLIC BLOOD PRESSURE: 70 MMHG | RESPIRATION RATE: 18 BRPM | WEIGHT: 199.96 LBS | SYSTOLIC BLOOD PRESSURE: 124 MMHG | HEART RATE: 80 BPM

## 2024-11-11 DIAGNOSIS — I26.99 OTHER PULMONARY EMBOLISM WITHOUT ACUTE COR PULMONALE: ICD-10-CM

## 2024-11-11 DIAGNOSIS — Z98.890 OTHER SPECIFIED POSTPROCEDURAL STATES: Chronic | ICD-10-CM

## 2024-11-11 DIAGNOSIS — R94.39 ABNORMAL RESULT OF OTHER CARDIOVASCULAR FUNCTION STUDY: ICD-10-CM

## 2024-11-11 DIAGNOSIS — Z00.8 ENCOUNTER FOR OTHER GENERAL EXAMINATION: ICD-10-CM

## 2024-11-11 DIAGNOSIS — R09.89 OTHER SPECIFIED SYMPTOMS AND SIGNS INVOLVING THE CIRCULATORY AND RESPIRATORY SYSTEMS: ICD-10-CM

## 2024-11-11 LAB
ALBUMIN SERPL ELPH-MCNC: 4.5 G/DL — SIGNIFICANT CHANGE UP (ref 3.5–5.2)
ALP SERPL-CCNC: 88 U/L — SIGNIFICANT CHANGE UP (ref 30–115)
ALT FLD-CCNC: 30 U/L — SIGNIFICANT CHANGE UP (ref 0–41)
ANION GAP SERPL CALC-SCNC: 10 MMOL/L — SIGNIFICANT CHANGE UP (ref 7–14)
APTT BLD: 31.9 SEC — SIGNIFICANT CHANGE UP (ref 27–39.2)
AST SERPL-CCNC: 28 U/L — SIGNIFICANT CHANGE UP (ref 0–41)
BASOPHILS # BLD AUTO: 0.04 K/UL — SIGNIFICANT CHANGE UP (ref 0–0.2)
BASOPHILS NFR BLD AUTO: 0.5 % — SIGNIFICANT CHANGE UP (ref 0–1)
BILIRUB SERPL-MCNC: 0.9 MG/DL — SIGNIFICANT CHANGE UP (ref 0.2–1.2)
BUN SERPL-MCNC: 20 MG/DL — SIGNIFICANT CHANGE UP (ref 10–20)
CALCIUM SERPL-MCNC: 10 MG/DL — SIGNIFICANT CHANGE UP (ref 8.4–10.5)
CHLORIDE SERPL-SCNC: 104 MMOL/L — SIGNIFICANT CHANGE UP (ref 98–110)
CO2 SERPL-SCNC: 28 MMOL/L — SIGNIFICANT CHANGE UP (ref 17–32)
CREAT SERPL-MCNC: 1.2 MG/DL — SIGNIFICANT CHANGE UP (ref 0.7–1.5)
D DIMER BLD IA.RAPID-MCNC: 5757 NG/ML DDU — HIGH
EGFR: 63 ML/MIN/1.73M2 — SIGNIFICANT CHANGE UP
EOSINOPHIL # BLD AUTO: 0.07 K/UL — SIGNIFICANT CHANGE UP (ref 0–0.7)
EOSINOPHIL NFR BLD AUTO: 0.8 % — SIGNIFICANT CHANGE UP (ref 0–8)
GLUCOSE SERPL-MCNC: 137 MG/DL — HIGH (ref 70–99)
HCT VFR BLD CALC: 51.5 % — SIGNIFICANT CHANGE UP (ref 42–52)
HGB BLD-MCNC: 17.1 G/DL — SIGNIFICANT CHANGE UP (ref 14–18)
IMM GRANULOCYTES NFR BLD AUTO: 0.4 % — HIGH (ref 0.1–0.3)
INR BLD: 1 RATIO — SIGNIFICANT CHANGE UP (ref 0.65–1.3)
LYMPHOCYTES # BLD AUTO: 1.27 K/UL — SIGNIFICANT CHANGE UP (ref 1.2–3.4)
LYMPHOCYTES # BLD AUTO: 15.2 % — LOW (ref 20.5–51.1)
MCHC RBC-ENTMCNC: 32.4 PG — HIGH (ref 27–31)
MCHC RBC-ENTMCNC: 33.2 G/DL — SIGNIFICANT CHANGE UP (ref 32–37)
MCV RBC AUTO: 97.5 FL — HIGH (ref 80–94)
MONOCYTES # BLD AUTO: 0.59 K/UL — SIGNIFICANT CHANGE UP (ref 0.1–0.6)
MONOCYTES NFR BLD AUTO: 7.1 % — SIGNIFICANT CHANGE UP (ref 1.7–9.3)
NEUTROPHILS # BLD AUTO: 6.33 K/UL — SIGNIFICANT CHANGE UP (ref 1.4–6.5)
NEUTROPHILS NFR BLD AUTO: 76 % — HIGH (ref 42.2–75.2)
NRBC # BLD: 0 /100 WBCS — SIGNIFICANT CHANGE UP (ref 0–0)
NT-PROBNP SERPL-SCNC: 173 PG/ML — SIGNIFICANT CHANGE UP (ref 0–300)
PLATELET # BLD AUTO: 171 K/UL — SIGNIFICANT CHANGE UP (ref 130–400)
PMV BLD: 10.6 FL — HIGH (ref 7.4–10.4)
POTASSIUM SERPL-MCNC: 4 MMOL/L — SIGNIFICANT CHANGE UP (ref 3.5–5)
POTASSIUM SERPL-SCNC: 4 MMOL/L — SIGNIFICANT CHANGE UP (ref 3.5–5)
PROT SERPL-MCNC: 6.9 G/DL — SIGNIFICANT CHANGE UP (ref 6–8)
PROTHROM AB SERPL-ACNC: 11.8 SEC — SIGNIFICANT CHANGE UP (ref 9.95–12.87)
RBC # BLD: 5.28 M/UL — SIGNIFICANT CHANGE UP (ref 4.7–6.1)
RBC # FLD: 14.4 % — SIGNIFICANT CHANGE UP (ref 11.5–14.5)
SODIUM SERPL-SCNC: 142 MMOL/L — SIGNIFICANT CHANGE UP (ref 135–146)
TROPONIN T, HIGH SENSITIVITY RESULT: 111 NG/L — CRITICAL HIGH (ref 6–21)
WBC # BLD: 8.33 K/UL — SIGNIFICANT CHANGE UP (ref 4.8–10.8)
WBC # FLD AUTO: 8.33 K/UL — SIGNIFICANT CHANGE UP (ref 4.8–10.8)

## 2024-11-11 PROCEDURE — 80048 BASIC METABOLIC PNL TOTAL CA: CPT

## 2024-11-11 PROCEDURE — 37184 PRIM ART M-THRMBC 1ST VSL: CPT | Mod: RT

## 2024-11-11 PROCEDURE — 83735 ASSAY OF MAGNESIUM: CPT

## 2024-11-11 PROCEDURE — 93308 TTE F-UP OR LMTD: CPT | Mod: 26

## 2024-11-11 PROCEDURE — 85730 THROMBOPLASTIN TIME PARTIAL: CPT

## 2024-11-11 PROCEDURE — 86901 BLOOD TYPING SEROLOGIC RH(D): CPT

## 2024-11-11 PROCEDURE — 76937 US GUIDE VASCULAR ACCESS: CPT

## 2024-11-11 PROCEDURE — C1757: CPT

## 2024-11-11 PROCEDURE — 87641 MR-STAPH DNA AMP PROBE: CPT

## 2024-11-11 PROCEDURE — 99291 CRITICAL CARE FIRST HOUR: CPT | Mod: GC

## 2024-11-11 PROCEDURE — 86850 RBC ANTIBODY SCREEN: CPT

## 2024-11-11 PROCEDURE — 84484 ASSAY OF TROPONIN QUANT: CPT

## 2024-11-11 PROCEDURE — 93306 TTE W/DOPPLER COMPLETE: CPT | Mod: 26

## 2024-11-11 PROCEDURE — C1769: CPT

## 2024-11-11 PROCEDURE — 36014 PLACE CATHETER IN ARTERY: CPT | Mod: 50

## 2024-11-11 PROCEDURE — C1894: CPT

## 2024-11-11 PROCEDURE — 85610 PROTHROMBIN TIME: CPT

## 2024-11-11 PROCEDURE — 80053 COMPREHEN METABOLIC PANEL: CPT

## 2024-11-11 PROCEDURE — 93010 ELECTROCARDIOGRAM REPORT: CPT

## 2024-11-11 PROCEDURE — C1887: CPT

## 2024-11-11 PROCEDURE — 93970 EXTREMITY STUDY: CPT | Mod: 26

## 2024-11-11 PROCEDURE — 71046 X-RAY EXAM CHEST 2 VIEWS: CPT | Mod: 26

## 2024-11-11 PROCEDURE — 71275 CT ANGIOGRAPHY CHEST: CPT | Mod: 26,MH

## 2024-11-11 PROCEDURE — 85027 COMPLETE CBC AUTOMATED: CPT

## 2024-11-11 PROCEDURE — 85025 COMPLETE CBC W/AUTO DIFF WBC: CPT

## 2024-11-11 PROCEDURE — 87640 STAPH A DNA AMP PROBE: CPT

## 2024-11-11 PROCEDURE — 86900 BLOOD TYPING SEROLOGIC ABO: CPT

## 2024-11-11 PROCEDURE — 36415 COLL VENOUS BLD VENIPUNCTURE: CPT

## 2024-11-11 PROCEDURE — 84100 ASSAY OF PHOSPHORUS: CPT

## 2024-11-11 RX ORDER — HEPARIN SODIUM 10000 [USP'U]/ML
7500 INJECTION INTRAVENOUS; SUBCUTANEOUS EVERY 6 HOURS
Refills: 0 | Status: DISCONTINUED | OUTPATIENT
Start: 2024-11-11 | End: 2024-11-13

## 2024-11-11 RX ORDER — AMLODIPINE BESYLATE 10 MG
5 TABLET ORAL AT BEDTIME
Refills: 0 | Status: DISCONTINUED | OUTPATIENT
Start: 2024-11-11 | End: 2024-11-14

## 2024-11-11 RX ORDER — ENOXAPARIN SODIUM 40MG/0.4ML
90 SYRINGE (ML) SUBCUTANEOUS ONCE
Refills: 0 | Status: COMPLETED | OUTPATIENT
Start: 2024-11-11 | End: 2024-11-11

## 2024-11-11 RX ORDER — HEPARIN SODIUM 10000 [USP'U]/ML
7500 INJECTION INTRAVENOUS; SUBCUTANEOUS ONCE
Refills: 0 | Status: COMPLETED | OUTPATIENT
Start: 2024-11-11 | End: 2024-11-11

## 2024-11-11 RX ORDER — LOSARTAN POTASSIUM 25 MG/1
100 TABLET ORAL DAILY
Refills: 0 | Status: DISCONTINUED | OUTPATIENT
Start: 2024-11-11 | End: 2024-11-14

## 2024-11-11 RX ORDER — FINASTERIDE 5 MG/1
5 TABLET, FILM COATED ORAL DAILY
Refills: 0 | Status: DISCONTINUED | OUTPATIENT
Start: 2024-11-11 | End: 2024-11-14

## 2024-11-11 RX ORDER — ROSUVASTATIN CALCIUM 10 MG
10 TABLET ORAL DAILY
Refills: 0 | Status: DISCONTINUED | OUTPATIENT
Start: 2024-11-11 | End: 2024-11-14

## 2024-11-11 RX ORDER — HEPARIN SODIUM 10000 [USP'U]/ML
3500 INJECTION INTRAVENOUS; SUBCUTANEOUS EVERY 6 HOURS
Refills: 0 | Status: DISCONTINUED | OUTPATIENT
Start: 2024-11-11 | End: 2024-11-13

## 2024-11-11 RX ORDER — TAMSULOSIN HCL 0.4 MG
0.4 CAPSULE ORAL DAILY
Refills: 0 | Status: DISCONTINUED | OUTPATIENT
Start: 2024-11-11 | End: 2024-11-14

## 2024-11-11 RX ORDER — HEPARIN SODIUM 10000 [USP'U]/ML
INJECTION INTRAVENOUS; SUBCUTANEOUS
Qty: 25000 | Refills: 0 | Status: DISCONTINUED | OUTPATIENT
Start: 2024-11-11 | End: 2024-11-13

## 2024-11-11 RX ADMIN — HEPARIN SODIUM 1700 UNIT(S)/HR: 10000 INJECTION INTRAVENOUS; SUBCUTANEOUS at 20:29

## 2024-11-11 RX ADMIN — HEPARIN SODIUM 7500 UNIT(S): 10000 INJECTION INTRAVENOUS; SUBCUTANEOUS at 20:28

## 2024-11-11 RX ADMIN — Medication 90 MILLIGRAM(S): at 16:59

## 2024-11-11 RX ADMIN — Medication 5 MILLIGRAM(S): at 22:38

## 2024-11-11 RX ADMIN — LOSARTAN POTASSIUM 100 MILLIGRAM(S): 25 TABLET ORAL at 22:38

## 2024-11-11 NOTE — H&P ADULT - HISTORY OF PRESENT ILLNESS
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  Patient has been experiencing shortness of breath and spoke to Dr. Padron at a recent visit.  Dr. Padron sent him in for CTA outpatient earlier today which showed PE, and sent him to the ED for further management.  In ED, bp 124/70, hr 80, rr 18, LaN887% on RA  Labs: hgb 17.1, wbc 8k, creat  1.2, D-Dimer 5757, trop 111, proBNP 173  CTA chest:  Acute lumbar pulmonary emboli involving every lobe branch, most pronounced involving right lower/middle lobes and left upper lobe. RV/LV ratio is 1.52, indicative of right heart strain.  POCUS in ED: Borderline right heart strain most appreciated in A4 view. Grossly normal echo otherwise.    Patient admitted for further management.

## 2024-11-11 NOTE — ED ADULT TRIAGE NOTE - CHIEF COMPLAINT QUOTE
PT presents to the ED for c/o of blood clot. Pt states he was sent in by [facility] for a blood clot they found in his lung that they caught on the CTA.

## 2024-11-11 NOTE — ED PROVIDER NOTE - PHYSICAL EXAMINATION
VITAL SIGNS: I have reviewed nursing notes and confirm.  CONSTITUTIONAL: Well-appearing, non-toxic, in NAD  SKIN: Warm dry, normal skin  NECK: Supple; full ROM. Nontender. No cervical LAD  CARD: RRR, no murmurs, rubs or gallops  RESP: Clear to ausculation bilaterally.  No rales, rhonchi, or wheezing.

## 2024-11-11 NOTE — H&P ADULT - ASSESSMENT
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  Patient has been experiencing shortness of breath and spoke to Dr. Padron at a recent visit.  Dr. Padron sent him in for CTA outpatient earlier today which showed PE, and sent him to the ED for further management.    #Extensive bilateral acute lobar pulmonary emboli   #CT evidence of R heart strain  - Hx of unprovoked DVT/PE 1-1/2 years ago was on Eliquis but in December was taken off by his doctor  - Patient has been experiencing shortness of breath recently and Dr. Padron sent him in for CTA   - Patient HD stable, on RA  - ED vitals: bp 124/70, hr 80, rr 18, KmB892% on RA  - Labs significant for D-Dimer 5757, trop 111  - CTA chest: Acute lobar pulmonary emboli involving every lobe branch, most pronounced involving right lower/middle lobes and left upper lobe. RV/LV ratio is 1.52, indicative of right heart strain.  - Echo showed EF of 60 to 65%, Normal global left ventricular systolic function. Moderately enlarged right ventricle. Mildly reduced RV systolic function. Right ventricular volume overload. Estimated pulmonary artery systolic pressure is 47.2 mmHg assuming a right atrial pressure of 8 mmHg, which is consistent with mild pulmonary  hypertension. RV/LV Ratio: 1.1  RVOT VTI: 0.069 m  LVOT SV: 65 mL.  - check Duplex LE  - Start heparin drip  - Spoke with pulm fellow, PERT team activated, plan for thrombectomy in am  - NPO afterMN  - Monitor VS closely, admit to SDU  - Supplement O2 as needed, currently on RA  - Consider hem/onc eval for thrombophilia w/u for recurrent unprovoked DVT/PE    #Essential Hypertension  #Hyperlipidemia  -c/w home medications with therapeutic interchanges: olmesartan to losartan, rosuva to atorva    #Depression  -non-formulary to vilazodone    DVT ppx: Heparin drip  Diet: Dash A 78-year-old male past medical history of hypertension, dyslipidemia, BPH, depression and 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  Patient has been experiencing shortness of breath and spoke to Dr. Padron at a recent visit.  Dr. Padron sent him in for CTA outpatient earlier today which showed PE, and sent him to the ED for further management.    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 Padron sent him in for CTA   - Of note patient states traveled 4.5 hours by car 3 weeks ago  - Patient HD stable, on RA  - ED vitals: bp 124/70, hr 80, rr 18, XcA309% on RA  - Labs significant for D-Dimer 5757, trop 111  - CTA chest: Acute lobar pulmonary emboli involving every lobe branch, most pronounced involving right lower/middle lobes and left upper lobe. RV/LV ratio is 1.52, indicative of right heart strain.  - Echo showed EF of 60 to 65%, Normal global left ventricular systolic function. Moderately enlarged right ventricle. Mildly reduced RV systolic function. Right ventricular volume overload. Estimated pulmonary artery systolic pressure is 47.2 mmHg assuming a right atrial pressure of 8 mmHg, which is consistent with mild pulmonary  hypertension. RV/LV Ratio: 1.1  RVOT VTI: 0.069 m  LVOT SV: 65 mL.  - check Duplex LE  - Start heparin drip  - ICU consulted, PERT team activated, plan for thrombectomy in am  - NPO afterMN  - Monitor VS closely, admit to SDU  - Supplement O2 as needed, currently on RA  - Consider hem/onc eval for thrombophilia w/u for recurrent unprovoked DVT/PE    #Essential Hypertension  #Hyperlipidemia  -c/w home medications     #Depression  -Needs non-formulary to vilazodone    DVT ppx: Heparin drip  Diet: Dash

## 2024-11-11 NOTE — H&P ADULT - NSHPLABSRESULTS_GEN_ALL_CORE
.  LABS:                         17.1   8.33  )-----------( 171      ( 11 Nov 2024 15:10 )             51.5     11-11    142  |  104  |  20  ----------------------------<  137[H]  4.0   |  28  |  1.2    Ca    10.0      11 Nov 2024 15:10    TPro  6.9  /  Alb  4.5  /  TBili  0.9  /  DBili  x   /  AST  28  /  ALT  30  /  AlkPhos  88  11-11      Urinalysis Basic - ( 11 Nov 2024 15:10 )    Color: x / Appearance: x / SG: x / pH: x  Gluc: 137 mg/dL / Ketone: x  / Bili: x / Urobili: x   Blood: x / Protein: x / Nitrite: x   Leuk Esterase: x / RBC: x / WBC x   Sq Epi: x / Non Sq Epi: x / Bacteria: x            RADIOLOGY, EKG & ADDITIONAL TESTS: Reviewed.

## 2024-11-11 NOTE — ED ADULT TRIAGE NOTE - AS TEMP SITE
I would recommend a diet focused on vegetables, fruits, health protein that minimizes sweets, watch more than 1 portion of carbohydrates in a meal, avoid high sugar beverages and excess red meats. Maintain a healthy BMI of 20-25    Work to a lifestyle of regular a aerobic exercise = 40 minutes of moderate to intense physical activity  3-4 times a week. I would do this in conjunction with setting goals with physical therapy that you have been using for your back. Glad you are getting the sinuses checked out. Make an appointment for the [employer] letter from [place].     I am concerned this may be sleep apnea but let us check the thyroid first. oral

## 2024-11-11 NOTE — ED PROVIDER NOTE - CLINICAL SUMMARY MEDICAL DECISION MAKING FREE TEXT BOX
78-year-old male past medical history of hypertension hyperlipidemia BPH, history of unprovoked DVT/PE in 2023 no longer on Eliquis since December was taken off, sent by cardiologist for PE.  Patient has been having progressively worsening dyspnea on exertion over the last month.  Patient's cardiologist sent him for CTA to rule out PE today, was found to have bilateral PEs with right heart strain.  No chest pain.  No leg pain or swelling.  No fever cough.  No history of cancer smoking immobilization.  Has never seen heme-onc or had workup for clotting disorder.  No family history of clotting disorder.    On exam, AFVSS, Well appearing, No acute distress, NCAT, EOMI, PERRLA, MMM, Neck supple, LCTAB, RRR nl s1s2 No mrg, Abdomen Soft NTND, AAOx3, No Focal Deficits, No LE edema or calf TTP,    A/P; bilateral PEs with right heart strain, PERT team activated, discussed with Dr. Santiago and ICU fellow, patient approved for stepdown admission and Lovenox started in ED labs x-ray EKG performed official echo performed    Labs and EKG were ordered and reviewed.  Imaging was ordered and reviewed by me.  Appropriate medications for patient's presenting complaints were ordered and effects were reassessed.  Patient's records (prior hospital, ED visit, and/or nursing home notes if available) were reviewed.  Additional history was obtained from EMS, family, and/or PCP (where available).  Escalation to admission/observation was considered.  Patient requires inpatient hospitalization - monitored setting.

## 2024-11-11 NOTE — ED PROVIDER NOTE - ATTENDING CONTRIBUTION TO CARE
78-year-old male past medical history of hypertension hyperlipidemia BPH, history of unprovoked DVT/PE in 2023 no longer on Eliquis since December was taken off, sent by cardiologist for PE.  Patient has been having progressively worsening dyspnea on exertion over the last month.  Patient's cardiologist sent him for CTA to rule out PE today, was found to have bilateral PEs with right heart strain.  No chest pain.  No leg pain or swelling.  No fever cough.  No history of cancer smoking immobilization.  Has never seen heme-onc or had workup for clotting disorder.  No family history of clotting disorder.    On exam, AFVSS, Well appearing, No acute distress, NCAT, EOMI, PERRLA, MMM, Neck supple, LCTAB, RRR nl s1s2 No mrg, Abdomen Soft NTND, AAOx3, No Focal Deficits, No LE edema or calf TTP,    A/P; bilateral PEs with right heart strain, PERT team activated, discussed with Dr. Santiago and ICU fellow, patient approved for stepdown admission and Lovenox started in ED labs x-ray EKG performed official echo performed    Attending Statement: I have personally provided the amount of critical care time documented below excluding time spent on separate procedures.     Critical Care Time Spent (min) Must be 30 or more minutes to qualify: 45

## 2024-11-11 NOTE — ED PROVIDER NOTE - OBJECTIVE STATEMENT
78-year-old male past medical history of PE 1-1/2 years ago was on Eliquis but in December was taken off.  Patient has been experiencing shortness of breath and spoke to Dr. Padron at a recent visit.  Dr. Padron sent him in for CTA outpatient earlier today and when the results came back he told him to come to the emergency department because he had a PE.

## 2024-11-12 DIAGNOSIS — R94.39 ABNORMAL RESULT OF OTHER CARDIOVASCULAR FUNCTION STUDY: ICD-10-CM

## 2024-11-12 LAB
ALBUMIN SERPL ELPH-MCNC: 4.2 G/DL — SIGNIFICANT CHANGE UP (ref 3.5–5.2)
ALP SERPL-CCNC: 80 U/L — SIGNIFICANT CHANGE UP (ref 30–115)
ALT FLD-CCNC: 27 U/L — SIGNIFICANT CHANGE UP (ref 0–41)
ANION GAP SERPL CALC-SCNC: 11 MMOL/L — SIGNIFICANT CHANGE UP (ref 7–14)
APTT BLD: 117.2 SEC — CRITICAL HIGH (ref 27–39.2)
APTT BLD: 117.6 SEC — CRITICAL HIGH (ref 27–39.2)
APTT BLD: >200 SEC — CRITICAL HIGH (ref 27–39.2)
APTT BLD: >200 SEC — CRITICAL HIGH (ref 27–39.2)
AST SERPL-CCNC: 25 U/L — SIGNIFICANT CHANGE UP (ref 0–41)
BASOPHILS # BLD AUTO: 0.07 K/UL — SIGNIFICANT CHANGE UP (ref 0–0.2)
BASOPHILS NFR BLD AUTO: 0.8 % — SIGNIFICANT CHANGE UP (ref 0–1)
BILIRUB SERPL-MCNC: 1.1 MG/DL — SIGNIFICANT CHANGE UP (ref 0.2–1.2)
BUN SERPL-MCNC: 22 MG/DL — HIGH (ref 10–20)
CALCIUM SERPL-MCNC: 10.1 MG/DL — SIGNIFICANT CHANGE UP (ref 8.4–10.5)
CHLORIDE SERPL-SCNC: 104 MMOL/L — SIGNIFICANT CHANGE UP (ref 98–110)
CO2 SERPL-SCNC: 27 MMOL/L — SIGNIFICANT CHANGE UP (ref 17–32)
CREAT SERPL-MCNC: 1.1 MG/DL — SIGNIFICANT CHANGE UP (ref 0.7–1.5)
EGFR: 70 ML/MIN/1.73M2 — SIGNIFICANT CHANGE UP
EOSINOPHIL # BLD AUTO: 0.28 K/UL — SIGNIFICANT CHANGE UP (ref 0–0.7)
EOSINOPHIL NFR BLD AUTO: 3.4 % — SIGNIFICANT CHANGE UP (ref 0–8)
GLUCOSE SERPL-MCNC: 87 MG/DL — SIGNIFICANT CHANGE UP (ref 70–99)
HCT VFR BLD CALC: 47.2 % — SIGNIFICANT CHANGE UP (ref 42–52)
HCT VFR BLD CALC: 48.9 % — SIGNIFICANT CHANGE UP (ref 42–52)
HGB BLD-MCNC: 16.2 G/DL — SIGNIFICANT CHANGE UP (ref 14–18)
HGB BLD-MCNC: 16.3 G/DL — SIGNIFICANT CHANGE UP (ref 14–18)
IMM GRANULOCYTES NFR BLD AUTO: 0.4 % — HIGH (ref 0.1–0.3)
INR BLD: 1.07 RATIO — SIGNIFICANT CHANGE UP (ref 0.65–1.3)
LYMPHOCYTES # BLD AUTO: 2.14 K/UL — SIGNIFICANT CHANGE UP (ref 1.2–3.4)
LYMPHOCYTES # BLD AUTO: 25.6 % — SIGNIFICANT CHANGE UP (ref 20.5–51.1)
MAGNESIUM SERPL-MCNC: 2.1 MG/DL — SIGNIFICANT CHANGE UP (ref 1.8–2.4)
MCHC RBC-ENTMCNC: 32.3 PG — HIGH (ref 27–31)
MCHC RBC-ENTMCNC: 32.5 PG — HIGH (ref 27–31)
MCHC RBC-ENTMCNC: 33.3 G/DL — SIGNIFICANT CHANGE UP (ref 32–37)
MCHC RBC-ENTMCNC: 34.3 G/DL — SIGNIFICANT CHANGE UP (ref 32–37)
MCV RBC AUTO: 94.6 FL — HIGH (ref 80–94)
MCV RBC AUTO: 96.8 FL — HIGH (ref 80–94)
MONOCYTES # BLD AUTO: 0.72 K/UL — HIGH (ref 0.1–0.6)
MONOCYTES NFR BLD AUTO: 8.6 % — SIGNIFICANT CHANGE UP (ref 1.7–9.3)
NEUTROPHILS # BLD AUTO: 5.11 K/UL — SIGNIFICANT CHANGE UP (ref 1.4–6.5)
NEUTROPHILS NFR BLD AUTO: 61.2 % — SIGNIFICANT CHANGE UP (ref 42.2–75.2)
NRBC # BLD: 0 /100 WBCS — SIGNIFICANT CHANGE UP (ref 0–0)
NRBC # BLD: 0 /100 WBCS — SIGNIFICANT CHANGE UP (ref 0–0)
PLATELET # BLD AUTO: 156 K/UL — SIGNIFICANT CHANGE UP (ref 130–400)
PLATELET # BLD AUTO: 161 K/UL — SIGNIFICANT CHANGE UP (ref 130–400)
PMV BLD: 10.6 FL — HIGH (ref 7.4–10.4)
PMV BLD: 10.9 FL — HIGH (ref 7.4–10.4)
POTASSIUM SERPL-MCNC: 4.7 MMOL/L — SIGNIFICANT CHANGE UP (ref 3.5–5)
POTASSIUM SERPL-SCNC: 4.7 MMOL/L — SIGNIFICANT CHANGE UP (ref 3.5–5)
PROT SERPL-MCNC: 6.5 G/DL — SIGNIFICANT CHANGE UP (ref 6–8)
PROTHROM AB SERPL-ACNC: 12.6 SEC — SIGNIFICANT CHANGE UP (ref 9.95–12.87)
RBC # BLD: 4.99 M/UL — SIGNIFICANT CHANGE UP (ref 4.7–6.1)
RBC # BLD: 5.05 M/UL — SIGNIFICANT CHANGE UP (ref 4.7–6.1)
RBC # FLD: 14.2 % — SIGNIFICANT CHANGE UP (ref 11.5–14.5)
RBC # FLD: 14.5 % — SIGNIFICANT CHANGE UP (ref 11.5–14.5)
SODIUM SERPL-SCNC: 142 MMOL/L — SIGNIFICANT CHANGE UP (ref 135–146)
WBC # BLD: 10.88 K/UL — HIGH (ref 4.8–10.8)
WBC # BLD: 8.35 K/UL — SIGNIFICANT CHANGE UP (ref 4.8–10.8)
WBC # FLD AUTO: 10.88 K/UL — HIGH (ref 4.8–10.8)
WBC # FLD AUTO: 8.35 K/UL — SIGNIFICANT CHANGE UP (ref 4.8–10.8)

## 2024-11-12 PROCEDURE — 37184 PRIM ART M-THRMBC 1ST VSL: CPT | Mod: XS,LT

## 2024-11-12 PROCEDURE — 76937 US GUIDE VASCULAR ACCESS: CPT | Mod: 26

## 2024-11-12 PROCEDURE — 99223 1ST HOSP IP/OBS HIGH 75: CPT

## 2024-11-12 PROCEDURE — 36014 PLACE CATHETER IN ARTERY: CPT | Mod: 50

## 2024-11-12 RX ORDER — INFLUENZ VIR VAC TV P-SURF2003 15MCG/.5ML
0.5 SYRINGE (ML) INTRAMUSCULAR ONCE
Refills: 0 | Status: DISCONTINUED | OUTPATIENT
Start: 2024-11-12 | End: 2024-11-14

## 2024-11-12 RX ADMIN — HEPARIN SODIUM 1400 UNIT(S)/HR: 10000 INJECTION INTRAVENOUS; SUBCUTANEOUS at 04:50

## 2024-11-12 RX ADMIN — HEPARIN SODIUM 0 UNIT(S)/HR: 10000 INJECTION INTRAVENOUS; SUBCUTANEOUS at 03:57

## 2024-11-12 RX ADMIN — HEPARIN SODIUM 1200 UNIT(S)/HR: 10000 INJECTION INTRAVENOUS; SUBCUTANEOUS at 18:48

## 2024-11-12 RX ADMIN — LOSARTAN POTASSIUM 100 MILLIGRAM(S): 25 TABLET ORAL at 06:00

## 2024-11-12 RX ADMIN — HEPARIN SODIUM 1200 UNIT(S)/HR: 10000 INJECTION INTRAVENOUS; SUBCUTANEOUS at 12:11

## 2024-11-12 RX ADMIN — Medication 5 MILLIGRAM(S): at 21:55

## 2024-11-12 NOTE — PROGRESS NOTE ADULT - SUBJECTIVE AND OBJECTIVE BOX
ISELA PAIGE  78y  Male      Patient is a 78y old  Male who presents with a chief complaint of     INTERVAL HPI/OVERNIGHT EVENTS:    No events over night, patient still reports CUMMINS and lightheadedness when leaning forward, patient desated to 93% after walking in the hallway    REVIEW OF SYSTEMS:    ROS negative    Vital Signs Last 24 Hrs  T(C): 36.7 (12 Nov 2024 16:10), Max: 36.7 (11 Nov 2024 22:38)  T(F): 98 (12 Nov 2024 16:10), Max: 98 (11 Nov 2024 22:38)  HR: 74 (12 Nov 2024 16:10) (70 - 90)  BP: 138/65 (12 Nov 2024 16:10) (133/63 - 166/83)  BP(mean): 111 (12 Nov 2024 13:03) (111 - 111)  RR: 20 (12 Nov 2024 16:10) (18 - 20)  SpO2: 96% (12 Nov 2024 16:10) (95% - 97%)    Parameters below as of 12 Nov 2024 07:23  Patient On (Oxygen Delivery Method): room air        PHYSICAL EXAM:  GENERAL: NAD  HEAD:  Atraumatic, Normocephalic  EYES: EOMI, PERRLA, conjunctiva and sclera clear  ENMT: Moist mucous membranes, Good dentition, No lesions  NECK: Supple, No JVD, Normal thyroid  NERVOUS SYSTEM:  Alert & Oriented X3, Good concentration  CHEST/LUNG: Clear to auscultation bilaterally  HEART: Regular rate and rhythm  ABDOMEN: Soft, Nontender, Nondistended; Bowel sounds present  EXTREMITIES:  No Le edema      Consultant(s) Notes Reviewed:  [x ] YES  [ ] NO  Care Discussed with Consultants/Other Providers [ x] YES  [ ] NO    LABS:                        16.3   8.35  )-----------( 161      ( 12 Nov 2024 07:57 )             48.9     11-12    142  |  104  |  22[H]  ----------------------------<  87  4.7   |  27  |  1.1    Ca    10.1      12 Nov 2024 07:57  Mg     2.1     11-12    TPro  6.5  /  Alb  4.2  /  TBili  1.1  /  DBili  x   /  AST  25  /  ALT  27  /  AlkPhos  80  11-12    PT/INR - ( 12 Nov 2024 07:57 )   PT: 12.60 sec;   INR: 1.07 ratio         PTT - ( 12 Nov 2024 15:50 )  PTT:>200.0 sec  Urinalysis Basic - ( 12 Nov 2024 07:57 )    Color: x / Appearance: x / SG: x / pH: x  Gluc: 87 mg/dL / Ketone: x  / Bili: x / Urobili: x   Blood: x / Protein: x / Nitrite: x   Leuk Esterase: x / RBC: x / WBC x   Sq Epi: x / Non Sq Epi: x / Bacteria: x        CAPILLARY BLOOD GLUCOSE          RADIOLOGY & ADDITIONAL TESTS:    Imaging Personally Reviewed:  [ ] YES  [ ] NO

## 2024-11-12 NOTE — PROGRESS NOTE ADULT - SUBJECTIVE AND OBJECTIVE BOX
INTERVENTIONAL RADIOLOGY BRIEF-OPERATIVE NOTE    Procedure: PE thrombectomy    Pre-Op Diagnosis:  Submassive PE    Post-Op Diagnosis: same    Attending: Cristobal  Resident: None    Anesthesia (type):  [ ] General Anesthesia  [ x] Sedation by Anesthesiology  [ ] Spinal Anesthesia  [ x] Local/Regional    Contrast: 75 cc    Estimated Blood Loss:  200 cc     Condition:   [ ] Critical  [ ] Serious  [ ] Fair   [x ] Good    Findings/Follow up Plan of Care:  Successful bilateral pe thrombectomy with resolution of clot.  Pre procedural main pulmonary artery pressures-44/14 (27); Post intervetion-34/18 (25)    Complications:    Disposition: CEU.  Keep right leg straight until tomorrow morning.  Can restart heparin if PTT is not elevated.      Please call Interventional Radiology [phone] with any questions, concerns, or issues.

## 2024-11-12 NOTE — PRE-OP CHECKLIST - WARM FLUIDS/WARM BLANKETS
Additional Notes: Detail Level: Simple no

## 2024-11-12 NOTE — PROGRESS NOTE ADULT - ASSESSMENT
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  Patient has been experiencing shortness of breath and spoke to Dr. Padron at a recent visit.  Dr. Padron sent him in for CTA outpatient earlier today which showed PE, and sent him to the ED for further management.    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 Padron sent him in for CTA   - Of note patient states traveled 4.5 hours by car 3 weeks ago  - Patient HD stable, on RA  - ED vitals: bp 124/70, hr 80, rr 18, SfC382% on RA  - Labs significant for D-Dimer 5757, trop 111  - CTA chest: Acute lobar pulmonary emboli involving every lobe branch, most pronounced involving right lower/middle lobes and left upper lobe. RV/LV ratio is 1.52, indicative of right heart strain.  - Echo showed EF of 60 to 65%, Normal global left ventricular systolic function. Moderately enlarged right ventricle. Mildly reduced RV systolic function. Right ventricular volume overload. Estimated pulmonary artery systolic pressure is 47.2 mmHg assuming a right atrial pressure of 8 mmHg, which is consistent with mild pulmonary  hypertension. RV/LV Ratio: 1.1  RVOT VTI: 0.069 m  LVOT SV: 65 mL.  -cw heparin drip  - s/p thrombectomy today  -f/u on IR recs post thrombectomy and patient's clinical status  -patient's saturation dropped to 93% upon ambulation  - Supplement O2 as needed, currently on RA  - Consider hem/onc eval for thrombophilia w/u for recurrent unprovoked DVT/PE    #Essential Hypertension  #Hyperlipidemia  -c/w home medications     #Depression  -Needs non-formulary to vilazodone    DVT ppx: Heparin drip  Diet: Dash

## 2024-11-12 NOTE — CHART NOTE - NSCHARTNOTEFT_GEN_A_CORE
PACU ANESTHESIA ADMISSION NOTE      _x___  Patent Airway    __x__  Full return of protective reflexes    __x__  Full recovery from anesthesia / back to baseline     Vitals:   T:   37.2        R:  15                BP:     174/98             Sat:       99            P: 87      Mental Status:  __x__ Awake   ___x__ Alert   _____ Drowsy   _____ Sedated    Nausea/Vomiting:  ___x_ NO  ______Yes,   See Post - Op Orders          Pain Scale (0-10):  _____    Treatment: __x__ None    ____ See Post - Op/PCA Orders    Post - Operative Fluids:   ____ Oral   __x__ See Post - Op Orders    Plan: Discharge:   ____Home       _____Floor     _____Critical Care    _x____  Other:_________________    Comments:  No anesthesia complication noted, signout given to PACU RN

## 2024-11-12 NOTE — ED ADULT NURSE REASSESSMENT NOTE - NS ED NURSE REASSESS COMMENT FT1
Report given to Miracle SOUSA. Pre Op checklist started to this RN ability. Heparin rate adjusted. Pt stable for IR

## 2024-11-12 NOTE — ED ADULT NURSE REASSESSMENT NOTE - NS ED NURSE REASSESS COMMENT FT1
aPTT resulted and was greater then 200.  As per nomogram Heparin drip is stopped for 1 hour and then will be decreased by 3mL/hr which will then be started again at 14mL/hr. MD mijares.

## 2024-11-12 NOTE — CONSULT NOTE ADULT - SUBJECTIVE AND OBJECTIVE BOX
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  Patient has been experiencing shortness of breath and spoke to Dr. Padron at a recent visit.  Dr. Padron sent him in for CTA outpatient earlier today which showed PE, and sent him to the ED for further management.  In ED, bp 124/70, hr 80, rr 18, [id number]% on RA  Labs: hgb 17.1, wbc 8k, creat  1.2, D-Dimer 5757, trop 111, proBNP 173  CTA chest:  Acute lumbar pulmonary emboli involving every lobe branch, most pronounced involving right lower/middle lobes and left upper lobe. RV/LV ratio is 1.52, indicative of right heart strain.  POCUS in ED: Borderline right heart strain most appreciated in A4 view. Grossly normal echo otherwise.    Patient admitted for further management. (11 Nov 2024 17:53)      PAST MEDICAL & SURGICAL HISTORY:  HTN (hypertension)      High cholesterol      BPH (benign prostatic hyperplasia)      Kidney stones      Depression      History of tonsillectomy      H/O lithotripsy          MEDICATIONS  (STANDING):  amLODIPine   Tablet 5 milliGRAM(s) Oral at bedtime  finasteride 5 milliGRAM(s) Oral daily  heparin  Infusion.  Unit(s)/Hr (17 mL/Hr) IV Continuous <Continuous>  losartan 100 milliGRAM(s) Oral daily  rosuvastatin 10 milliGRAM(s) Oral daily  tamsulosin 0.4 milliGRAM(s) Oral daily    MEDICATIONS  (PRN):  heparin   Injectable 3500 Unit(s) IV Push every 6 hours PRN For aPTT between 40 - 57  heparin   Injectable 7500 Unit(s) IV Push every 6 hours PRN For aPTT less than 40      Allergies    No Known Allergies    Intolerances          FAMILY HISTORY:  FH: prostate cancer        Physical Exam:   Vital Signs Last 24 Hrs  T(C): 36.5 (12 Nov 2024 07:23), Max: 36.7 (11 Nov 2024 22:38)  T(F): 97.7 (12 Nov 2024 07:23), Max: 98 (11 Nov 2024 22:38)  HR: 76 (12 Nov 2024 07:23) (70 - 90)  BP: 151/90 (12 Nov 2024 07:23) (124/70 - 166/83)  BP(mean): 111 (12 Nov 2024 07:23) (111 - 111)  RR: 18 (12 Nov 2024 07:23) (18 - 18)  SpO2: 95% (12 Nov 2024 07:23) (95% - 97%)    Parameters below as of 12 Nov 2024 07:23  Patient On (Oxygen Delivery Method): room air          Labs:                         16.2   10.88 )-----------( 156      ( 12 Nov 2024 02:30 )             47.2     11-11    142  |  104  |  20  ----------------------------<  137[H]  4.0   |  28  |  1.2    Ca    10.0      11 Nov 2024 15:10    TPro  6.9  /  Alb  4.5  /  TBili  0.9  /  DBili  x   /  AST  28  /  ALT  30  /  AlkPhos  88  11-11    PT/INR - ( 11 Nov 2024 17:22 )   PT: 11.80 sec;   INR: 1.00 ratio         PTT - ( 12 Nov 2024 02:30 )  PTT:>200.0 sec    Pertinent labs:                      16.2   10.88 )-----------( 156      ( 12 Nov 2024 02:30 )             47.2       11-11    142  |  104  |  20  ----------------------------<  137[H]  4.0   |  28  |  1.2    Ca    10.0      11 Nov 2024 15:10    TPro  6.9  /  Alb  4.5  /  TBili  0.9  /  DBili  x   /  AST  28  /  ALT  30  /  AlkPhos  88  11-11      PT/INR - ( 11 Nov 2024 17:22 )   PT: 11.80 sec;   INR: 1.00 ratio         PTT - ( 12 Nov 2024 02:30 )  PTT:>200.0 sec    Radiology & Additional Studies:     Radiology imaging reviewed.       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 Was recently in a long distance car ride and experienced SOB afterwards. CTA outpatient showed PE, and sent him to the ED for further management. IR consulted for PE thrombectomy.    -Attending spoke with patient at bedside.  -Currently not requiring supplemental oxygen. At rest no sob/chest pain.   -Primary team to perform a 5 minute walk test. If he becomes significantly symptomatic and hypoxic, will reconsider PE thrombectomy.  -No acute IR intervention at this time.    Thank you for the courtesy of this consult, please call [phone] with any further questions.   
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  Patient has been experiencing shortness of breath and spoke to Dr. Padron at a recent visit.  Dr. Padron sent him in for CTA outpatient earlier today which showed PE, and sent him to the ED for further management.  In ED, bp 124/70, hr 80, rr 18, [id number]% on RA  Labs: hgb 17.1, wbc 8k, creat  1.2, D-Dimer 5757, trop 111, proBNP 173  CTA chest:  Acute lumbar pulmonary emboli involving every lobe branch, most pronounced involving right lower/middle lobes and left upper lobe. RV/LV ratio is 1.52, indicative of right heart strain.  POCUS in ED: Borderline right heart strain most appreciated in A4 view. Grossly normal echo otherwise.    Patient admitted for further management. (11 Nov 2024 17:53)      PAST MEDICAL & SURGICAL HISTORY:  HTN (hypertension)      High cholesterol      BPH (benign prostatic hyperplasia)      Kidney stones      Depression      History of tonsillectomy      H/O lithotripsy          SOCIAL HX:   Smoking  NO                        ETOH                            Other    FAMILY HISTORY:  FH: prostate cancer    :  No known cardiovacular family hisotry     Review Of Systems:     All ROS are negative except per HPI       Allergies    No Known Allergies    Intolerances          PHYSICAL EXAM    ICU Vital Signs Last 24 Hrs  T(C): 36.7 (12 Nov 2024 06:01), Max: 36.7 (11 Nov 2024 22:38)  T(F): 98 (12 Nov 2024 06:01), Max: 98 (11 Nov 2024 22:38)  HR: 76 (12 Nov 2024 06:01) (70 - 90)  BP: 166/83 (12 Nov 2024 06:01) (124/70 - 166/83)  BP(mean): --  ABP: --  ABP(mean): --  RR: 18 (12 Nov 2024 06:01) (18 - 18)  SpO2: 97% (12 Nov 2024 06:01) (97% - 97%)    O2 Parameters below as of 12 Nov 2024 06:01  Patient On (Oxygen Delivery Method): room air            CONSTITUTIONAL:  Well nourished.   NAD    ENT:   Airway patent,   Mouth with normal mucosa.   No thrush      CARDIAC:   Normal rate,   Regular rhythm.    No edema    RESPIRATORY:   No wheezing  Bilateral BS   Not tachypneic,  No use of accessory muscles    GASTROINTESTINAL:  Abdomen soft,   Non-tender,   No guarding,   + BS      NEUROLOGICAL:   Alert and oriented   No motor deficits.    SKIN:   Skin normal color for race,   No evidence of rash.      LABS:                          16.2   10.88 )-----------( 156      ( 12 Nov 2024 02:30 )             47.2                                               11-11    142  |  104  |  20  ----------------------------<  137[H]  4.0   |  28  |  1.2    Ca    10.0      11 Nov 2024 15:10    TPro  6.9  /  Alb  4.5  /  TBili  0.9  /  DBili  x   /  AST  28  /  ALT  30  /  AlkPhos  88  11-11      PT/INR - ( 11 Nov 2024 17:22 )   PT: 11.80 sec;   INR: 1.00 ratio         PTT - ( 12 Nov 2024 02:30 )  PTT:>200.0 sec                                       Urinalysis Basic - ( 11 Nov 2024 15:10 )    Color: x / Appearance: x / SG: x / pH: x  Gluc: 137 mg/dL / Ketone: x  / Bili: x / Urobili: x   Blood: x / Protein: x / Nitrite: x   Leuk Esterase: x / RBC: x / WBC x   Sq Epi: x / Non Sq Epi: x / Bacteria: x                                                  LIVER FUNCTIONS - ( 11 Nov 2024 15:10 )  Alb: 4.5 g/dL / Pro: 6.9 g/dL / ALK PHOS: 88 U/L / ALT: 30 U/L / AST: 28 U/L / GGT: x                                                                                                                                       X-Rays reviewed                                                                                     ECHO    CXR interpreted by me No infiltrate     MEDICATIONS  (STANDING):  amLODIPine   Tablet 5 milliGRAM(s) Oral at bedtime  finasteride 5 milliGRAM(s) Oral daily  heparin  Infusion.  Unit(s)/Hr (17 mL/Hr) IV Continuous <Continuous>  losartan 100 milliGRAM(s) Oral daily  rosuvastatin 10 milliGRAM(s) Oral daily  tamsulosin 0.4 milliGRAM(s) Oral daily    MEDICATIONS  (PRN):  heparin   Injectable 3500 Unit(s) IV Push every 6 hours PRN For aPTT between 40 - 57  heparin   Injectable 7500 Unit(s) IV Push every 6 hours PRN For aPTT less than 40

## 2024-11-12 NOTE — CHART NOTE - NSCHARTNOTEFT_GEN_A_CORE
Patient desaturated to 93 on room air during walk test after 1 minute and was symptomatic.   Will schedule PE thrombectomy tentatively for today. Keep patient NPO.

## 2024-11-12 NOTE — PATIENT PROFILE ADULT - FALL HARM RISK - HARM RISK INTERVENTIONS

## 2024-11-12 NOTE — CONSULT NOTE ADULT - ASSESSMENT
IMPRESSION:    High risk intermediate PE   HO VTE in 2023     PLAN:    CNS:  No depressants     HEENT: Oral care    PULMONARY:  HOB @ 45 degrees.  Aspiration precautions.  CT chest noted.  On RA     CARDIOVASCULAR:  ECHO noted.  Cardiology follow up .      GI: GI prophylaxis.  Feeding.  Bowel regimen     RENAL:  Follow up lytes.  Correct as needed    INFECTIOUS DISEASE: Follow up cultures.  Monitor VS     HEMATOLOGICAL:  DVT TX.  LE duplex.  Would transition to ELiquis.  Needs OP hem onc eval.  Will need long term AC     ENDOCRINE:  Follow up FS.  Insulin protocol if needed.    MUSCULOSKELETAL:  Bed rest today    Tele

## 2024-11-13 ENCOUNTER — TRANSCRIPTION ENCOUNTER (OUTPATIENT)
Age: 79
End: 2024-11-13

## 2024-11-13 LAB
ALBUMIN SERPL ELPH-MCNC: 3.9 G/DL — SIGNIFICANT CHANGE UP (ref 3.5–5.2)
ALP SERPL-CCNC: 74 U/L — SIGNIFICANT CHANGE UP (ref 30–115)
ALT FLD-CCNC: 25 U/L — SIGNIFICANT CHANGE UP (ref 0–41)
ANION GAP SERPL CALC-SCNC: 9 MMOL/L — SIGNIFICANT CHANGE UP (ref 7–14)
APTT BLD: 50.5 SEC — HIGH (ref 27–39.2)
APTT BLD: 66.2 SEC — HIGH (ref 27–39.2)
APTT BLD: 78.3 SEC — CRITICAL HIGH (ref 27–39.2)
AST SERPL-CCNC: 24 U/L — SIGNIFICANT CHANGE UP (ref 0–41)
BILIRUB SERPL-MCNC: 0.9 MG/DL — SIGNIFICANT CHANGE UP (ref 0.2–1.2)
BUN SERPL-MCNC: 19 MG/DL — SIGNIFICANT CHANGE UP (ref 10–20)
CALCIUM SERPL-MCNC: 9.4 MG/DL — SIGNIFICANT CHANGE UP (ref 8.4–10.4)
CHLORIDE SERPL-SCNC: 106 MMOL/L — SIGNIFICANT CHANGE UP (ref 98–110)
CO2 SERPL-SCNC: 25 MMOL/L — SIGNIFICANT CHANGE UP (ref 17–32)
CREAT SERPL-MCNC: 1.1 MG/DL — SIGNIFICANT CHANGE UP (ref 0.7–1.5)
EGFR: 70 ML/MIN/1.73M2 — SIGNIFICANT CHANGE UP
GLUCOSE SERPL-MCNC: 85 MG/DL — SIGNIFICANT CHANGE UP (ref 70–99)
HCT VFR BLD CALC: 46.6 % — SIGNIFICANT CHANGE UP (ref 42–52)
HGB BLD-MCNC: 15.8 G/DL — SIGNIFICANT CHANGE UP (ref 14–18)
MAGNESIUM SERPL-MCNC: 1.9 MG/DL — SIGNIFICANT CHANGE UP (ref 1.8–2.4)
MCHC RBC-ENTMCNC: 32.3 PG — HIGH (ref 27–31)
MCHC RBC-ENTMCNC: 33.9 G/DL — SIGNIFICANT CHANGE UP (ref 32–37)
MCV RBC AUTO: 95.3 FL — HIGH (ref 80–94)
MRSA PCR RESULT.: NEGATIVE — SIGNIFICANT CHANGE UP
NRBC # BLD: 0 /100 WBCS — SIGNIFICANT CHANGE UP (ref 0–0)
PHOSPHATE SERPL-MCNC: 4 MG/DL — SIGNIFICANT CHANGE UP (ref 2.1–4.9)
PLATELET # BLD AUTO: 157 K/UL — SIGNIFICANT CHANGE UP (ref 130–400)
PMV BLD: 10.9 FL — HIGH (ref 7.4–10.4)
POTASSIUM SERPL-MCNC: 4.1 MMOL/L — SIGNIFICANT CHANGE UP (ref 3.5–5)
POTASSIUM SERPL-SCNC: 4.1 MMOL/L — SIGNIFICANT CHANGE UP (ref 3.5–5)
PROT SERPL-MCNC: 6.1 G/DL — SIGNIFICANT CHANGE UP (ref 6–8)
RBC # BLD: 4.89 M/UL — SIGNIFICANT CHANGE UP (ref 4.7–6.1)
RBC # FLD: 14.3 % — SIGNIFICANT CHANGE UP (ref 11.5–14.5)
SODIUM SERPL-SCNC: 140 MMOL/L — SIGNIFICANT CHANGE UP (ref 135–146)
TROPONIN T, HIGH SENSITIVITY RESULT: 46 NG/L — HIGH (ref 6–21)
WBC # BLD: 7.78 K/UL — SIGNIFICANT CHANGE UP (ref 4.8–10.8)
WBC # FLD AUTO: 7.78 K/UL — SIGNIFICANT CHANGE UP (ref 4.8–10.8)

## 2024-11-13 PROCEDURE — 99232 SBSQ HOSP IP/OBS MODERATE 35: CPT

## 2024-11-13 PROCEDURE — 99233 SBSQ HOSP IP/OBS HIGH 50: CPT

## 2024-11-13 RX ORDER — ALPRAZOLAM 0.25 MG
1 TABLET ORAL
Qty: 14 | Refills: 0
Start: 2024-11-13 | End: 2024-11-26

## 2024-11-13 RX ORDER — HEPARIN SODIUM 10000 [USP'U]/ML
1400 INJECTION INTRAVENOUS; SUBCUTANEOUS
Qty: 25000 | Refills: 0 | Status: DISCONTINUED | OUTPATIENT
Start: 2024-11-13 | End: 2024-11-13

## 2024-11-13 RX ORDER — HEPARIN SODIUM 10000 [USP'U]/ML
7500 INJECTION INTRAVENOUS; SUBCUTANEOUS EVERY 6 HOURS
Refills: 0 | Status: DISCONTINUED | OUTPATIENT
Start: 2024-11-13 | End: 2024-11-13

## 2024-11-13 RX ORDER — CHLORHEXIDINE GLUCONATE 40 MG/ML
1 SOLUTION TOPICAL DAILY
Refills: 0 | Status: DISCONTINUED | OUTPATIENT
Start: 2024-11-13 | End: 2024-11-14

## 2024-11-13 RX ORDER — HEPARIN SODIUM 10000 [USP'U]/ML
7500 INJECTION INTRAVENOUS; SUBCUTANEOUS ONCE
Refills: 0 | Status: DISCONTINUED | OUTPATIENT
Start: 2024-11-13 | End: 2024-11-13

## 2024-11-13 RX ORDER — HEPARIN SODIUM 10000 [USP'U]/ML
3500 INJECTION INTRAVENOUS; SUBCUTANEOUS EVERY 6 HOURS
Refills: 0 | Status: DISCONTINUED | OUTPATIENT
Start: 2024-11-13 | End: 2024-11-13

## 2024-11-13 RX ORDER — ACETAMINOPHEN 500 MG
650 TABLET ORAL EVERY 6 HOURS
Refills: 0 | Status: DISCONTINUED | OUTPATIENT
Start: 2024-11-13 | End: 2024-11-14

## 2024-11-13 RX ORDER — APIXABAN 5 MG/1
10 TABLET, FILM COATED ORAL EVERY 12 HOURS
Refills: 0 | Status: DISCONTINUED | OUTPATIENT
Start: 2024-11-13 | End: 2024-11-14

## 2024-11-13 RX ADMIN — HEPARIN SODIUM 1400 UNIT(S)/HR: 10000 INJECTION INTRAVENOUS; SUBCUTANEOUS at 03:43

## 2024-11-13 RX ADMIN — APIXABAN 10 MILLIGRAM(S): 5 TABLET, FILM COATED ORAL at 17:03

## 2024-11-13 RX ADMIN — Medication 650 MILLIGRAM(S): at 08:04

## 2024-11-13 RX ADMIN — HEPARIN SODIUM 1400 UNIT(S)/HR: 10000 INJECTION INTRAVENOUS; SUBCUTANEOUS at 14:22

## 2024-11-13 RX ADMIN — Medication 0.4 MILLIGRAM(S): at 11:37

## 2024-11-13 RX ADMIN — FINASTERIDE 5 MILLIGRAM(S): 5 TABLET, FILM COATED ORAL at 11:37

## 2024-11-13 RX ADMIN — HEPARIN SODIUM 1400 UNIT(S)/HR: 10000 INJECTION INTRAVENOUS; SUBCUTANEOUS at 13:29

## 2024-11-13 RX ADMIN — LOSARTAN POTASSIUM 100 MILLIGRAM(S): 25 TABLET ORAL at 06:42

## 2024-11-13 RX ADMIN — Medication 5 MILLIGRAM(S): at 21:34

## 2024-11-13 RX ADMIN — Medication 10 MILLIGRAM(S): at 11:37

## 2024-11-13 NOTE — DISCHARGE NOTE PROVIDER - CARE PROVIDERS DIRECT ADDRESSES
[email or web address]

## 2024-11-13 NOTE — DISCHARGE NOTE PROVIDER - NSDCCPCAREPLAN_GEN_ALL_CORE_FT
PRINCIPAL DISCHARGE DIAGNOSIS  Diagnosis: Pulmonary embolism  Assessment and Plan of Treatment: You were admitted because of shortness of breath. Cat scans of the lungs found blood clots in the vessels. You underwent thrombectomy procedure to remove the blood clots. You were dischagred on blood thinners (eliquis).  After discharge, please take medications as prescribed. Please follow up with yout primary care doctor.     PRINCIPAL DISCHARGE DIAGNOSIS  Diagnosis: Pulmonary embolism  Assessment and Plan of Treatment: You were admitted because of shortness of breath. Cat scans of the lungs found blood clots in the vessels. You underwent thrombectomy procedure to remove the blood clots. You were dischagred on blood thinners (eliquis). Please take the Eliquis twice a day at 10mg (2x 5mg tablets) for 6 more days until 11/20, then starting 11/21 reduce it to 1 tablet (1x 5mg) twice a day onwards.  After discharge, please take medications as prescribed. Please follow up with yout primary care doctor.     PRINCIPAL DISCHARGE DIAGNOSIS  Diagnosis: Pulmonary embolism  Assessment and Plan of Treatment: You were admitted because of shortness of breath. Cat scans of the lungs found blood clots in the vessels. You underwent thrombectomy procedure to remove the blood clots. You were dischagred on blood thinners (eliquis). Please take the Eliquis twice a day at 10mg (2x 5mg tablets) for 6 more days until 11/20, then starting 11/21 reduce it to 1 tablet (1x 5mg) twice a day onwards.   After discharge, please take medications as prescribed. Please follow up with yout primary care doctor.  Also need age appropriate screening

## 2024-11-13 NOTE — PROGRESS NOTE ADULT - SUBJECTIVE AND OBJECTIVE BOX
ISELA PAIGE  78y Male    CHIEF COMPLAINT:    Patient is a 78y old  Male who presents with a chief complaint of SOB (2024 10:58)    INTERVAL HPI/OVERNIGHT EVENTS:    Patient seen and examined. No acute events overnight. Feels well currently, denies any active complaints     ROS: All other systems are negative.    Vital Signs:    T(F): 97.7 (24 @ 11:00), Max: 98 (24 @ 16:00)  HR: 78 (24 @ 11:00) (66 - 83)  BP: 111/77 (24 @ 11:00) (106/74 - 147/94)  RR: 18 (24 @ 11:00) (18 - 20)  SpO2: 96% (24 @ 12:55) (90% - 97%)    2024 07:01  -  2024 07:00  --------------------------------------------------------  IN: 0 mL / OUT: 750 mL / NET: -750 mL    Daily Weight in k.7 (2024 04:00)    PHYSICAL EXAM:    GENERAL:  NAD  SKIN: No rashes or lesions  HEENT: Atraumatic. Normocephalic.    NECK: Supple, No JVD.    PULMONARY: CTA B/L. No wheezing. No rales  CVS: Normal S1, S2. Rate and Rhythm are regular.    ABDOMEN/GI: Soft, Nontender, Nondistended   MSK:  No clubbing or cyanosis   NEUROLOGIC: moves all extremities   PSYCH: Alert & oriented x 3, normal affect    Consultant(s) Notes Reviewed:  [x ] YES  [ ] NO  Care Discussed with Consultants/Other Providers [ x] YES  [ ] NO    LABS:                        15.8   7.78  )-----------( 157      ( 2024 04:50 )             46.6     140  |  106  |  19  ----------------------------<  85  4.1   |  25  |  1.1    Ca    9.4      2024 04:50  Phos  4.0     11-13  Mg     1.9         TPro  6.1  /  Alb  3.9  /  TBili  0.9  /  DBili  x   /  AST  24  /  ALT  25  /  AlkPhos  74  11-    PT/INR - ( 2024 07:57 )   PT: 12.60 sec;   INR: 1.07 ratio       PTT - ( 2024 11:05 )  PTT:78.3 sec    RADIOLOGY & ADDITIONAL TESTS:  Imaging or report Personally Reviewed:  [x] YES  [ ] NO  EKG reviewed: [x] YES  [ ] NO    Medications:  Standing  amLODIPine   Tablet 5 milliGRAM(s) Oral at bedtime  chlorhexidine 2% Cloths 1 Application(s) Topical daily  finasteride 5 milliGRAM(s) Oral daily  heparin   Injectable 7500 Unit(s) IV Push once  heparin  Infusion. 1400 Unit(s)/Hr IV Continuous <Continuous>  heparin  Infusion.  Unit(s)/Hr IV Continuous <Continuous>  influenza  Vaccine (HIGH DOSE) 0.5 milliLiter(s) IntraMuscular once  losartan 100 milliGRAM(s) Oral daily  rosuvastatin 10 milliGRAM(s) Oral daily  tamsulosin 0.4 milliGRAM(s) Oral daily    PRN Meds  acetaminophen     Tablet .. 650 milliGRAM(s) Oral every 6 hours PRN  heparin   Injectable 3500 Unit(s) IV Push every 6 hours PRN  heparin   Injectable 7500 Unit(s) IV Push every 6 hours PRN  heparin   Injectable 7500 Unit(s) IV Push every 6 hours PRN

## 2024-11-13 NOTE — DISCHARGE NOTE PROVIDER - NSDCMRMEDTOKEN_GEN_ALL_CORE_FT
amLODIPine 5 mg oral tablet: 1 tab(s) orally once a day  finasteride 5 mg oral tablet: 1 tab(s) orally once a day  olmesartan 40 mg oral tablet: 1 tab(s) orally once a day  rosuvastatin 10 mg oral capsule: 1 tab(s) orally once a day  tamsulosin 0.4 mg oral capsule: 1 tab(s) orally once a day  vilazodone 40 mg oral tablet: 1 tab(s) orally once a day   amLODIPine 5 mg oral tablet: 1 tab(s) orally once a day  apixaban 5 mg oral tablet: 2 tab(s) orally every 12 hours Take 2 tablets (10mg) twice a day for 6 more days (including 11/20), then reduce to 1 tablets (5mg) twice daily onwards  finasteride 5 mg oral tablet: 1 tab(s) orally once a day  olmesartan 40 mg oral tablet: 1 tab(s) orally once a day  rosuvastatin 10 mg oral capsule: 1 tab(s) orally once a day  tamsulosin 0.4 mg oral capsule: 1 tab(s) orally once a day  vilazodone 40 mg oral tablet: 1 tab(s) orally once a day

## 2024-11-13 NOTE — CHART NOTE - NSCHARTNOTEFT_GEN_A_CORE
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  Patient has been experiencing shortness of breath and spoke to Dr. Padron at a recent visit.  Dr. Padron sent him in for CTA outpatient earlier today which showed PE, and sent him to the ED for further management.  In ED, bp 124/70, hr 80, rr 18, RqZ565% on RA  Labs: hgb 17.1, wbc 8k, creat  1.2, D-Dimer 5757, trop 111, proBNP 173  CTA chest:  Acute lumbar pulmonary emboli involving every lobe branch, most pronounced involving right lower/middle lobes and left upper lobe. RV/LV ratio is 1.52, indicative of right heart strain.  POCUS in ED: Borderline right heart strain most appreciated in A4 view. Grossly normal echo otherwise.    Hospital course:  - 11/11: started heparin gtt  - 11/12: ambulation pulse ox test positive. thrombectomy by IR   - 11/13: f/u repeat trop. pulse ox once ambulating. TTE post-procedure showed mildly reduced RV systolic fuction. EF nl. anticipate dc tm.          --------------------------------------------------------------------------------------------------------  PMH/PSH:  PAST MEDICAL & SURGICAL HISTORY:  HTN (hypertension)    High cholesterol    BPH (benign prostatic hyperplasia)    Kidney stones    Depression    History of tonsillectomy    H/O lithotripsy        -------------------------------------------------------------------------------------------------------  PHYSICAL EXAM:  General: NAD  HEENT: Atraumatic  Respiratory: CTAB  Cardiac: RRR  Abdomen: soft, non-tender, non-distended  Extremities: warm and well-perfused  Neuro: A+Ox4. No FND    Vital Signs Last 24 Hrs  T(C): 36.3 (13 Nov 2024 07:40), Max: 36.7 (12 Nov 2024 12:05)  T(F): 97.4 (13 Nov 2024 07:40), Max: 98 (12 Nov 2024 12:05)  HR: 81 (13 Nov 2024 07:40) (66 - 83)  BP: 106/74 (13 Nov 2024 07:40) (106/74 - 147/94)  BP(mean): 86 (13 Nov 2024 07:40) (86 - 117)  RR: 18 (13 Nov 2024 07:40) (18 - 20)  SpO2: 94% (13 Nov 2024 07:40) (90% - 97%)    Parameters below as of 12 Nov 2024 20:26  Patient On (Oxygen Delivery Method): room air        I&O's Summary    12 Nov 2024 07:01  -  13 Nov 2024 07:00  --------------------------------------------------------  IN: 0 mL / OUT: 750 mL / NET: -750 mL        --------------------------------------------------------------------------------------------------------  LABS:                               15.8   7.78  )-----------( 157      ( 13 Nov 2024 04:50 )             46.6       11-13    140  |  106  |  19  ----------------------------<  85  4.1   |  25  |  1.1    Ca    9.4      13 Nov 2024 04:50  Phos  4.0     11-13  Mg     1.9     11-13    TPro  6.1  /  Alb  3.9  /  TBili  0.9  /  DBili  x   /  AST  24  /  ALT  25  /  AlkPhos  74  11-13      PT/INR - ( 12 Nov 2024 07:57 )   PT: 12.60 sec;   INR: 1.07 ratio         PTT - ( 13 Nov 2024 04:50 )  PTT:66.2 sec            Specimen Source:   Method Type:   Gram Stain:   Culture Results:   Bacteria:       -------------------------------------------------------------------------------------------------  RADIOLOGY:      ---------------------------------------------------------------------------------------------------  ASSESSMENT & PLAN:       A 78-year-old male past medical history of hypertension, dyslipidemia, BPH, depression and 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  Patient has been experiencing shortness of breath and spoke to Dr. Padron at a recent visit.  Dr. Padron sent him in for CTA outpatient earlier today which showed PE, and sent him to the ED for further management.    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 Padron sent him in for CTA   - Of note patient states traveled 4.5 hours by car 3 weeks ago  - Patient HD stable, on RA  - ED vitals: bp 124/70, hr 80, rr 18, FzE814% on RA  - Labs significant for D-Dimer 5757, trop 111  - CTA chest: Acute lobar pulmonary emboli involving every lobe branch, most pronounced involving right lower/middle lobes and left upper lobe. RV/LV ratio is 1.52, indicative of right heart strain.  - Echo showed EF of 60 to 65%, Normal global left ventricular systolic function. Moderately enlarged right ventricle. Mildly reduced RV systolic function. Right ventricular volume overload. Estimated pulmonary artery systolic pressure is 47.2 mmHg assuming a right atrial pressure of 8 mmHg, which is consistent with mild pulmonary  hypertension. RV/LV Ratio: 1.1  RVOT VTI: 0.069 m  LVOT SV: 65 mL.  - s/p thrombectomy 11/12  - dc on eliquis [facility] loading  - Consider hem/onc eval for thrombophilia w/u for recurrent unprovoked DVT/PE    #Essential Hypertension  #Hyperlipidemia  -c/w amlodipine 5 mg and losartan 100mg daily      #Depression  -Needs non-formulary to vilazodone        DVT ppx: Heparin drip  Diet: Dash            FOR FOLLOW UP:  [pirce eliquis ]   [ambulation pulse ox ]   [repeat trop ] Transfer Note    Transfer from: CEU    Transfer to: (x  ) Medicine    (  ) Telemetry     (  ) RCU       (  ) CEU    (  ) VENT                        (  ) Palliative    (  ) Stroke Unit    (  ) MICU    (  ) CCU    Signout given to:     ----------------------------------------------------------------------------------------------------------  HPI / ICU COURSE:    HPI:  A 78-year-old male past medical history of hypertension, dyslipidemia, BPH, depression and 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  Patient has been experiencing shortness of breath and spoke to Dr. Padron at a recent visit.  Dr. Padron sent him in for CTA outpatient earlier today which showed PE, and sent him to the ED for further management.  In ED, bp 124/70, hr 80, rr 18, YnI202% on RA  Labs: hgb 17.1, wbc 8k, creat  1.2, D-Dimer 5757, trop 111, proBNP 173  CTA chest:  Acute lumbar pulmonary emboli involving every lobe branch, most pronounced involving right lower/middle lobes and left upper lobe. RV/LV ratio is 1.52, indicative of right heart strain.  POCUS in ED: Borderline right heart strain most appreciated in A4 view. Grossly normal echo otherwise.    Hospital course:  - 11/11: started heparin gtt  - 11/12: ambulation pulse ox test positive. thrombectomy by IR   - 11/13: f/u repeat trop. pulse ox once ambulating. TTE post-procedure showed mildly reduced RV systolic fuction. EF nl. anticipate dc tm.          --------------------------------------------------------------------------------------------------------  PMH/PSH:  PAST MEDICAL & SURGICAL HISTORY:  HTN (hypertension)    High cholesterol    BPH (benign prostatic hyperplasia)    Kidney stones    Depression    History of tonsillectomy    H/O lithotripsy        -------------------------------------------------------------------------------------------------------  PHYSICAL EXAM:  General: NAD  HEENT: Atraumatic  Respiratory: CTAB  Cardiac: RRR  Abdomen: soft, non-tender, non-distended  Extremities: warm and well-perfused  Neuro: A+Ox4. No FND    Vital Signs Last 24 Hrs  T(C): 36.3 (13 Nov 2024 07:40), Max: 36.7 (12 Nov 2024 12:05)  T(F): 97.4 (13 Nov 2024 07:40), Max: 98 (12 Nov 2024 12:05)  HR: 81 (13 Nov 2024 07:40) (66 - 83)  BP: 106/74 (13 Nov 2024 07:40) (106/74 - 147/94)  BP(mean): 86 (13 Nov 2024 07:40) (86 - 117)  RR: 18 (13 Nov 2024 07:40) (18 - 20)  SpO2: 94% (13 Nov 2024 07:40) (90% - 97%)    Parameters below as of 12 Nov 2024 20:26  Patient On (Oxygen Delivery Method): room air        I&O's Summary    12 Nov 2024 07:01  -  13 Nov 2024 07:00  --------------------------------------------------------  IN: 0 mL / OUT: 750 mL / NET: -750 mL        --------------------------------------------------------------------------------------------------------  LABS:                               15.8   7.78  )-----------( 157      ( 13 Nov 2024 04:50 )             46.6       11-13    140  |  106  |  19  ----------------------------<  85  4.1   |  25  |  1.1    Ca    9.4      13 Nov 2024 04:50  Phos  4.0     11-13  Mg     1.9     11-13    TPro  6.1  /  Alb  3.9  /  TBili  0.9  /  DBili  x   /  AST  24  /  ALT  25  /  AlkPhos  74  11-13      PT/INR - ( 12 Nov 2024 07:57 )   PT: 12.60 sec;   INR: 1.07 ratio         PTT - ( 13 Nov 2024 04:50 )  PTT:66.2 sec            Specimen Source:   Method Type:   Gram Stain:   Culture Results:   Bacteria:       -------------------------------------------------------------------------------------------------  RADIOLOGY:      ---------------------------------------------------------------------------------------------------  ASSESSMENT & PLAN:       A 78-year-old male past medical history of hypertension, dyslipidemia, BPH, depression and 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  Patient has been experiencing shortness of breath and spoke to Dr. Padron at a recent visit.  Dr. Padron sent him in for CTA outpatient earlier today which showed PE, and sent him to the ED for further management.    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 Padron sent him in for CTA   - Of note patient states traveled 4.5 hours by car 3 weeks ago  - Patient HD stable, on RA  - ED vitals: bp 124/70, hr 80, rr 18, WoL834% on RA  - Labs significant for D-Dimer 5757, trop 111  - CTA chest: Acute lobar pulmonary emboli involving every lobe branch, most pronounced involving right lower/middle lobes and left upper lobe. RV/LV ratio is 1.52, indicative of right heart strain.  - Echo showed EF of 60 to 65%, Normal global left ventricular systolic function. Moderately enlarged right ventricle. Mildly reduced RV systolic function. Right ventricular volume overload. Estimated pulmonary artery systolic pressure is 47.2 mmHg assuming a right atrial pressure of 8 mmHg, which is consistent with mild pulmonary  hypertension. RV/LV Ratio: 1.1  RVOT VTI: 0.069 m  LVOT SV: 65 mL.  - s/p thrombectomy 11/12  - start eliquis 10mg q12h in 11/13 pm  - dc on eliquis wth loading  - Consider hem/onc eval for thrombophilia w/u for recurrent unprovoked DVT/PE    #Essential Hypertension  #Hyperlipidemia  -c/w amlodipine 5 mg and losartan 100mg daily      #Depression  -Needs non-formulary to vilazodone        DVT ppx: Heparin drip  Diet: Dash            FOR FOLLOW UP:  [teena eliquis (its already sent to pharmacy]   [ambulation pulse ox ]   [repeat trop ]

## 2024-11-13 NOTE — DISCHARGE NOTE PROVIDER - PROVIDER TOKENS
PROVIDER:[TOKEN:[[id number]],FOLLOWUP:[1 week]] PROVIDER:[TOKEN:[[id number]],FOLLOWUP:[1 week]],PROVIDER:[TOKEN:[[id number]],FOLLOWUP:[1 month]]

## 2024-11-13 NOTE — DISCHARGE NOTE PROVIDER - CARE PROVIDER_API CALL
Princess Pardo  Internal Medicine  [address] [place] [address]  Phone: [phone]  Fax: [phone]  Follow Up Time: 1 week   Princess Pardo  Internal Medicine  [address] [place] [address]  Phone: [phone]  Fax: [phone]  Follow Up Time: 1 week    Michael Whyte)  Hematology  [address] [place] [address]  Phone: [phone]  Fax: [phone]  Follow Up Time: 1 month

## 2024-11-13 NOTE — PROGRESS NOTE ADULT - ASSESSMENT
IMPRESSION:    High risk intermediate PE s/p thrombectomy  HO VTE in 2023     PLAN:    CNS:  No depressants     HEENT: Oral care    PULMONARY:  HOB @ 45 degrees.  Aspiration precautions.  CT chest noted.  On nasal canula satting 100%, keep o2 >92%. Patient s/p thrombectomy by IR    CARDIOVASCULAR:  ECHO noted.  Cardiology follow up .      GI: GI prophylaxis.  Feeding.  Bowel regimen     RENAL:  Follow up lytes.  Correct as needed    INFECTIOUS DISEASE: Follow up cultures.  Monitor VS     HEMATOLOGICAL:  DVT TX.  LE duplex.  Transition to ELiquis.  Needs OP hem onc eval.  Will need long term AC     ENDOCRINE:  Follow up FS.  Insulin protocol if needed.    MUSCULOSKELETAL:  Bed rest today    Tele    IMPRESSION:    High risk intermediate PE s/p thrombectomy  HO VTE in 2023     PLAN:    CNS:  No depressants     HEENT: Oral care    PULMONARY:  HOB @ 45 degrees.  Aspiration precautions.  CT chest noted.  On nasal canula satting 100%, keep o2 >92%. Patient s/p thrombectomy by IR    CARDIOVASCULAR:  ECHO noted.  Cardiology follow up .      GI: GI prophylaxis.  Feeding.  Bowel regimen     RENAL:  Follow up lytes.  Correct as needed    INFECTIOUS DISEASE: Follow up cultures.  Monitor VS     HEMATOLOGICAL:  DVT TX.  LE duplex.  Transition to Eliquis  Needs OP hem onc eval.  Will need long term AC     ENDOCRINE:  Follow up FS.  Insulin protocol if needed.    MUSCULOSKELETAL:  Bed rest today    Floor

## 2024-11-13 NOTE — PROGRESS NOTE ADULT - ASSESSMENT
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  Patient has been experiencing shortness of breath and spoke to Dr. Padron at a recent visit.  Dr. Padron sent him in for CTA outpatient  which showed PE, and sent him to the ED for further management. patient admitted to SDU      Extensive bilateral acute lobar pulmonary emboli    CT evidence of R heart strain   Hx of unprovoked DVT/PE 1-1/2 years ago s/p ELiquis  -  CTA chest: Acute lobar pulmonary emboli involving every lobe branch, most pronounced involving right lower/middle lobes and left upper lobe. RV/LV ratio is 1.52, indicative of right heart strain  - LE duplex: DVT in the LEFT femoral, femoral and popliteal veins and gastrocnemius vein  - currently feels well, saturating 92-94% on RA  - s/p Thrombectomy 11/12 with IR. Sheath removed this AM by IR  - switch Heparin drip to ELiquis. Daniel CM re: eliquis pricing. Will need lifelong AC   - repeat another troponin today   - check ambulatory pulse ox   - heme onc eval as OP for hypercoagulable work up    Essential Hypertension  Hyperlipidemia  -c/w home medications     Depression  -Needs non-formulary to vilazodone    BPH - c/w tamsulosin, monitor UO    Pending: check ambulatory pulse ox, repeat trop, switch heparin to Eliquis, anticipate dc in 24-48 hours if continues to improve    Patient seen at bedside, total time spent to evaluate and treat the patient's acute illness and chronic medical conditions as well as time spent reviewing prior records, labs, radiology, documenting in electronic medical records,  discussing medical plan with   medical team was more than 40 minutes with >50% of time spent face to face with patient, discussing with patient/family as well as coordination of care

## 2024-11-13 NOTE — CHART NOTE - NSCHARTNOTEFT_GEN_A_CORE
Patient seen at bedside. Removed purse string suture without immediate complication. Compression was held at site for a few minutes. Dressing placed over access site in right groin.

## 2024-11-13 NOTE — PROGRESS NOTE ADULT - SUBJECTIVE AND OBJECTIVE BOX
Patient is a 78y old  Male who presents with a chief complaint of       Over Night Events:  Patient comfortable on nasal canula. not on pressors        PHYSICAL EXAM    ICU Vital Signs Last 24 Hrs  T(C): 36.3 (13 Nov 2024 07:40), Max: 36.7 (12 Nov 2024 12:05)  T(F): 97.4 (13 Nov 2024 07:40), Max: 98 (12 Nov 2024 12:05)  HR: 81 (13 Nov 2024 07:40) (66 - 83)  BP: 106/74 (13 Nov 2024 07:40) (106/74 - 147/94)  BP(mean): 86 (13 Nov 2024 07:40) (86 - 117)  ABP: --  ABP(mean): --  RR: 18 (13 Nov 2024 07:40) (18 - 20)  SpO2: 94% (13 Nov 2024 07:40) (90% - 97%)    O2 Parameters below as of 12 Nov 2024 20:26  Patient On (Oxygen Delivery Method): room air        CONSTITUTIONAL:  comfortable    ENT:   Airway patent,   Mouth with normal mucosa.        EYES:   Pupils equal,   Round and reactive to light.    CARDIAC:   Normal rate,   Regular rhythm.    No edema      RESPIRATORY:   No wheezing  Bilateral BS  Normal chest expansion  Not tachypneic,  No use of accessory muscles    GASTROINTESTINAL:  Abdomen soft,   Non-tender,   No guarding,   + BS    MUSCULOSKELETAL:   Range of motion is not limited,  No clubbing, cyanosis    NEUROLOGICAL:   Alert and oriented   No motor  deficits.    SKIN:   Skin normal color for race,   Warm and dry and intact.   No evidence of rash.         11-12-24 @ 07:01  -  11-13-24 @ 07:00  --------------------------------------------------------  IN:  Total IN: 0 mL    OUT:    Voided (mL): 750 mL  Total OUT: 750 mL    Total NET: -750 mL          LABS:                            15.8   7.78  )-----------( 157      ( 13 Nov 2024 04:50 )             46.6                                               11-13    140  |  106  |  19  ----------------------------<  85  4.1   |  25  |  1.1    Ca    9.4      13 Nov 2024 04:50  Phos  4.0     11-13  Mg     1.9     11-13    TPro  6.1  /  Alb  3.9  /  TBili  0.9  /  DBili  x   /  AST  24  /  ALT  25  /  AlkPhos  74  11-13      PT/INR - ( 12 Nov 2024 07:57 )   PT: 12.60 sec;   INR: 1.07 ratio         PTT - ( 13 Nov 2024 04:50 )  PTT:66.2 sec                                       Urinalysis Basic - ( 13 Nov 2024 04:50 )    Color: x / Appearance: x / SG: x / pH: x  Gluc: 85 mg/dL / Ketone: x  / Bili: x / Urobili: x   Blood: x / Protein: x / Nitrite: x   Leuk Esterase: x / RBC: x / WBC x   Sq Epi: x / Non Sq Epi: x / Bacteria: x                                                  LIVER FUNCTIONS - ( 13 Nov 2024 04:50 )  Alb: 3.9 g/dL / Pro: 6.1 g/dL / ALK PHOS: 74 U/L / ALT: 25 U/L / AST: 24 U/L / GGT: x                                                                                                                                       MEDICATIONS  (STANDING):  amLODIPine   Tablet 5 milliGRAM(s) Oral at bedtime  chlorhexidine 2% Cloths 1 Application(s) Topical daily  finasteride 5 milliGRAM(s) Oral daily  heparin  Infusion.  Unit(s)/Hr (17 mL/Hr) IV Continuous <Continuous>  influenza  Vaccine (HIGH DOSE) 0.5 milliLiter(s) IntraMuscular once  losartan 100 milliGRAM(s) Oral daily  rosuvastatin 10 milliGRAM(s) Oral daily  tamsulosin 0.4 milliGRAM(s) Oral daily    MEDICATIONS  (PRN):  acetaminophen     Tablet .. 650 milliGRAM(s) Oral every 6 hours PRN Moderate Pain (4 - 6)  heparin   Injectable 3500 Unit(s) IV Push every 6 hours PRN For aPTT between 40 - 57  heparin   Injectable 7500 Unit(s) IV Push every 6 hours PRN For aPTT less than 40      New X-rays reviewed:                                                                                  ECHO

## 2024-11-13 NOTE — DISCHARGE NOTE PROVIDER - ATTENDING DISCHARGE PHYSICAL EXAMINATION:
VITALS:   T(C): 36.4 (11-14-24 @ 05:29), Max: 37.1 (11-13-24 @ 19:55)  HR: 66 (11-14-24 @ 05:29) (66 - 92)  BP: 134/86 (11-14-24 @ 05:29) (111/84 - 134/86)  RR: 18 (11-14-24 @ 05:29) (18 - 18)  SpO2: 97% (11-13-24 @ 19:55) (94% - 97%)    GENERAL: NAD, lying in bed comfortably  HEART: Regular rate and rhythm  LUNGS: Unlabored respirations.  Clear to auscultation bilaterally  ABDOMEN: Soft, nontender, nondistended, +BS  EXTREMITIES: 2+ peripheral pulses bilaterally.  NERVOUS SYSTEM:  A&Ox3,

## 2024-11-13 NOTE — PROGRESS NOTE ADULT - ATTENDING COMMENTS
IMPRESSION:    High risk intermediate PE s/p thrombectomy  HO VTE in 2023     Plan as outlined above

## 2024-11-14 ENCOUNTER — TRANSCRIPTION ENCOUNTER (OUTPATIENT)
Age: 79
End: 2024-11-14

## 2024-11-14 ENCOUNTER — NON-APPOINTMENT (OUTPATIENT)
Age: 79
End: 2024-11-14

## 2024-11-14 VITALS
TEMPERATURE: 98 F | DIASTOLIC BLOOD PRESSURE: 73 MMHG | OXYGEN SATURATION: 96 % | HEART RATE: 81 BPM | RESPIRATION RATE: 18 BRPM | SYSTOLIC BLOOD PRESSURE: 109 MMHG

## 2024-11-14 LAB
ANION GAP SERPL CALC-SCNC: 10 MMOL/L — SIGNIFICANT CHANGE UP (ref 7–14)
BASOPHILS # BLD AUTO: 0.05 K/UL — SIGNIFICANT CHANGE UP (ref 0–0.2)
BASOPHILS NFR BLD AUTO: 0.6 % — SIGNIFICANT CHANGE UP (ref 0–1)
BUN SERPL-MCNC: 25 MG/DL — HIGH (ref 10–20)
CALCIUM SERPL-MCNC: 9.7 MG/DL — SIGNIFICANT CHANGE UP (ref 8.4–10.4)
CHLORIDE SERPL-SCNC: 105 MMOL/L — SIGNIFICANT CHANGE UP (ref 98–110)
CO2 SERPL-SCNC: 28 MMOL/L — SIGNIFICANT CHANGE UP (ref 17–32)
CREAT SERPL-MCNC: 1.4 MG/DL — SIGNIFICANT CHANGE UP (ref 0.7–1.5)
EGFR: 52 ML/MIN/1.73M2 — LOW
EOSINOPHIL # BLD AUTO: 0.25 K/UL — SIGNIFICANT CHANGE UP (ref 0–0.7)
EOSINOPHIL NFR BLD AUTO: 3.1 % — SIGNIFICANT CHANGE UP (ref 0–8)
GLUCOSE SERPL-MCNC: 90 MG/DL — SIGNIFICANT CHANGE UP (ref 70–99)
HCT VFR BLD CALC: 47.5 % — SIGNIFICANT CHANGE UP (ref 42–52)
HGB BLD-MCNC: 15.9 G/DL — SIGNIFICANT CHANGE UP (ref 14–18)
IMM GRANULOCYTES NFR BLD AUTO: 0.4 % — HIGH (ref 0.1–0.3)
LYMPHOCYTES # BLD AUTO: 1.79 K/UL — SIGNIFICANT CHANGE UP (ref 1.2–3.4)
LYMPHOCYTES # BLD AUTO: 22.4 % — SIGNIFICANT CHANGE UP (ref 20.5–51.1)
MAGNESIUM SERPL-MCNC: 2.1 MG/DL — SIGNIFICANT CHANGE UP (ref 1.8–2.4)
MCHC RBC-ENTMCNC: 32.4 PG — HIGH (ref 27–31)
MCHC RBC-ENTMCNC: 33.5 G/DL — SIGNIFICANT CHANGE UP (ref 32–37)
MCV RBC AUTO: 96.9 FL — HIGH (ref 80–94)
MONOCYTES # BLD AUTO: 0.83 K/UL — HIGH (ref 0.1–0.6)
MONOCYTES NFR BLD AUTO: 10.4 % — HIGH (ref 1.7–9.3)
NEUTROPHILS # BLD AUTO: 5.04 K/UL — SIGNIFICANT CHANGE UP (ref 1.4–6.5)
NEUTROPHILS NFR BLD AUTO: 63.1 % — SIGNIFICANT CHANGE UP (ref 42.2–75.2)
NRBC # BLD: 0 /100 WBCS — SIGNIFICANT CHANGE UP (ref 0–0)
PLATELET # BLD AUTO: 167 K/UL — SIGNIFICANT CHANGE UP (ref 130–400)
PMV BLD: 10.5 FL — HIGH (ref 7.4–10.4)
POTASSIUM SERPL-MCNC: 5 MMOL/L — SIGNIFICANT CHANGE UP (ref 3.5–5)
POTASSIUM SERPL-SCNC: 5 MMOL/L — SIGNIFICANT CHANGE UP (ref 3.5–5)
RBC # BLD: 4.9 M/UL — SIGNIFICANT CHANGE UP (ref 4.7–6.1)
RBC # FLD: 14.4 % — SIGNIFICANT CHANGE UP (ref 11.5–14.5)
SODIUM SERPL-SCNC: 143 MMOL/L — SIGNIFICANT CHANGE UP (ref 135–146)
WBC # BLD: 7.99 K/UL — SIGNIFICANT CHANGE UP (ref 4.8–10.8)
WBC # FLD AUTO: 7.99 K/UL — SIGNIFICANT CHANGE UP (ref 4.8–10.8)

## 2024-11-14 PROCEDURE — 99232 SBSQ HOSP IP/OBS MODERATE 35: CPT

## 2024-11-14 RX ORDER — APIXABAN 5 MG/1
2 TABLET, FILM COATED ORAL
Qty: 75 | Refills: 3
Start: 2024-11-14

## 2024-11-14 RX ORDER — APIXABAN 5 MG/1
1 TABLET, FILM COATED ORAL
Qty: 84 | Refills: 0
Start: 2024-11-14 | End: 2024-12-25

## 2024-11-14 RX ADMIN — LOSARTAN POTASSIUM 100 MILLIGRAM(S): 25 TABLET ORAL at 05:49

## 2024-11-14 RX ADMIN — CHLORHEXIDINE GLUCONATE 1 APPLICATION(S): 40 SOLUTION TOPICAL at 11:33

## 2024-11-14 RX ADMIN — APIXABAN 10 MILLIGRAM(S): 5 TABLET, FILM COATED ORAL at 05:49

## 2024-11-14 RX ADMIN — FINASTERIDE 5 MILLIGRAM(S): 5 TABLET, FILM COATED ORAL at 11:32

## 2024-11-14 RX ADMIN — Medication 0.4 MILLIGRAM(S): at 11:33

## 2024-11-14 RX ADMIN — Medication 10 MILLIGRAM(S): at 11:32

## 2024-11-14 NOTE — DISCHARGE NOTE NURSING/CASE MANAGEMENT/SOCIAL WORK - PATIENT PORTAL LINK FT
You can access the FollowMyHealth Patient Portal offered by [facility] by registering at the following website: [email or web address]. By joining [facility]’s FollowMyHealth portal, you will also be able to view your health information using other applications (apps) compatible with our system.

## 2024-11-14 NOTE — DISCHARGE NOTE NURSING/CASE MANAGEMENT/SOCIAL WORK - FINANCIAL ASSISTANCE
[facility] provides services at a reduced cost to those who are determined to be eligible through [facility]’s financial assistance program. Information regarding [facility]’s financial assistance program can be found by going to [email or web address] or by calling [phone].

## 2024-11-14 NOTE — PROGRESS NOTE ADULT - SUBJECTIVE AND OBJECTIVE BOX
ROYAL ISELA  78y  [facility]-[address]      Patient is a 78y old  Male who presents with a chief complaint of SOB (13 Nov 2024 10:58)      INTERVAL HPI/OVERNIGHT EVENTS:        REVIEW OF SYSTEMS:        FAMILY HISTORY:  FH: prostate cancer      T(C): 36.4 (11-14-24 @ 05:29), Max: 37.1 (11-13-24 @ 19:55)  HR: 66 (11-14-24 @ 05:29) (66 - 92)  BP: 134/86 (11-14-24 @ 05:29) (111/77 - 134/86)  RR: 18 (11-14-24 @ 05:29) (18 - 18)  SpO2: 97% (11-13-24 @ 19:55) (93% - 97%)  Wt(kg): --Vital Signs Last 24 Hrs  T(C): 36.4 (14 Nov 2024 05:29), Max: 37.1 (13 Nov 2024 19:55)  T(F): 97.5 (14 Nov 2024 05:29), Max: 98.8 (13 Nov 2024 19:55)  HR: 66 (14 Nov 2024 05:29) (66 - 92)  BP: 134/86 (14 Nov 2024 05:29) (111/77 - 134/86)  BP(mean): 94 (13 Nov 2024 15:54) (89 - 94)  RR: 18 (14 Nov 2024 05:29) (18 - 18)  SpO2: 97% (13 Nov 2024 19:55) (93% - 97%)    Parameters below as of 13 Nov 2024 19:55  Patient On (Oxygen Delivery Method): room air        PHYSICAL EXAM:  GENERAL: NAD, well-groomed, well-developed  HEAD:  Atraumatic, Normocephalic  EYES: EOMI, PERRLA, conjunctiva and sclera clear  ENMT: No tonsillar erythema, exudates, or enlargement; Moist mucous membranes, Good dentition, No lesions  NECK: Supple, No JVD, Normal thyroid  NERVOUS SYSTEM:  Alert & Oriented X3, Good concentration; Motor Strength 5/5 B/L upper and lower extremities; DTRs 2+ intact and symmetric  PULM: Clear to auscultation bilaterally  CARDIAC: Regular rate and rhythm; No murmurs, rubs, or gallops  GI: Soft, Nontender, Nondistended; Bowel sounds present  EXTREMITIES:  2+ Peripheral Pulses, No clubbing, cyanosis, or edema  LYMPH: No lymphadenopathy noted  SKIN: No rashes or lesions    Consultant(s) Notes Reviewed:  [x ] YES  [ ] NO  Care Discussed with Consultants/Other Providers [ x] YES  [ ] NO    LABS:                            15.9   7.99  )-----------( 167      ( 14 Nov 2024 07:06 )             47.5   11-14    143  |  105  |  25[H]  ----------------------------<  90  5.0   |  28  |  1.4    Ca    9.7      14 Nov 2024 07:06  Phos  4.0     11-13  Mg     2.1     11-14    TPro  6.1  /  Alb  3.9  /  TBili  0.9  /  DBili  x   /  AST  24  /  ALT  25  /  AlkPhos  74  11-13            acetaminophen     Tablet .. 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  Patient has been experiencing shortness of breath and spoke to Dr. Padron at a recent visit.  Dr. Padron sent him in for CTA outpatient  which showed PE, and sent him to the ED for further management. patient admitted to SDU     1. Extensive bilateral acute lobar pulmonary emboli s/p Thrombectomy 11/12 with IR. Sheath removed by IR. No sign of bleeding. 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 Sheath removed by IR. No sign of bleeding  - S/p Heparin drip. Now on Eliquis. Daniel WYNN re: eliquis pricing. Will need lifelong AC   - CTA chest:   a) Acute lobar pulmonary emboli involving every lobe branch, most pronounced involving right lower/middle lobes and left upper lobe. RV/LV ratio is 1.52, indicative of right heart strain  - LE duplex:   a) DVT in the LEFT femoral, femoral and popliteal veins and gastrocnemius vein  - Currently feels well, saturating 92-94% on RA  - Check ambulatory pulse ox  - Heme onc eval as OP for hypercoagulable work up    2. Essential Hypertension/Hyperlipidemia  - C/w home medications     3. Depression  - C/w Vilazodone    4. BPH   - c/w tamsulosin, monitor UO    DVT ppx: Eliquis  Diet: Dash    Pending: DC       ROYAL ISELA  78y  [facility] 3[address]      Patient is a 78y old  Male who presents with a chief complaint of SOB (13 Nov 2024 10:58)      INTERVAL HPI/OVERNIGHT EVENTS:    Patient feels well.   he has no complains   agreeable with the plan of care         FAMILY HISTORY:  FH: prostate cancer      T(C): 36.4 (11-14-24 @ 05:29), Max: 37.1 (11-13-24 @ 19:55)  HR: 66 (11-14-24 @ 05:29) (66 - 92)  BP: 134/86 (11-14-24 @ 05:29) (111/77 - 134/86)  RR: 18 (11-14-24 @ 05:29) (18 - 18)  SpO2: 97% (11-13-24 @ 19:55) (93% - 97%)  Wt(kg): --Vital Signs Last 24 Hrs  T(C): 36.4 (14 Nov 2024 05:29), Max: 37.1 (13 Nov 2024 19:55)  T(F): 97.5 (14 Nov 2024 05:29), Max: 98.8 (13 Nov 2024 19:55)  HR: 66 (14 Nov 2024 05:29) (66 - 92)  BP: 134/86 (14 Nov 2024 05:29) (111/77 - 134/86)  BP(mean): 94 (13 Nov 2024 15:54) (89 - 94)  RR: 18 (14 Nov 2024 05:29) (18 - 18)  SpO2: 97% (13 Nov 2024 19:55) (93% - 97%)    Parameters below as of 13 Nov 2024 19:55  Patient On (Oxygen Delivery Method): room air        PHYSICAL EXAM:  GENERAL: NAD, well-groomed, well-developed  PULM: Clear to auscultation bilaterally  CARDIAC: Regular rate and rhythm;  GI: Soft, Nontender, Nondistended; Bowel sounds present  EXTREMITIES:  2+ Peripheral Pulses,     Consultant(s) Notes Reviewed:  [x ] YES  [ ] NO  Care Discussed with Consultants/Other Providers [ x] YES  [ ] NO    LABS:                            15.9   7.99  )-----------( 167      ( 14 Nov 2024 07:06 )             47.5   11-14    143  |  105  |  25[H]  ----------------------------<  90  5.0   |  28  |  1.4    Ca    9.7      14 Nov 2024 07:06  Phos  4.0     11-13  Mg     2.1     11-14    TPro  6.1  /  Alb  3.9  /  TBili  0.9  /  DBili  x   /  AST  24  /  ALT  25  /  AlkPhos  74  11-13            acetaminophen     Tablet .. 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  Patient has been experiencing shortness of breath and spoke to Dr. Padron at a recent visit.  Dr. Padron sent him in for CTA outpatient  which showed PE, and sent him to the ED for further management. patient admitted to SDU     1. Extensive bilateral acute lobar pulmonary emboli s/p Thrombectomy 11/12 with IR. Sheath removed by IR. No sign of bleeding. 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 Sheath removed by IR. No sign of bleeding  - S/p Heparin drip. Now on Eliquis. aDniel WYNN re: eliquis pricing. Will need lifelong AC   - CTA chest:   a) Acute lobar pulmonary emboli involving every lobe branch, most pronounced involving right lower/middle lobes and left upper lobe. RV/LV ratio is 1.52, indicative of right heart strain  - LE duplex:   a) DVT in the LEFT femoral, femoral and popliteal veins and gastrocnemius vein  - Currently feels well, saturating 92-94% on RA  - Heme onc eval as OP for hypercoagulable work up    2. Essential Hypertension/Hyperlipidemia  - C/w home medications     3. Depression  - C/w Vilazodone    4. BPH   - c/w tamsulosin, monitor UO    DVT ppx: Eliquis  Diet: Dash

## 2024-11-14 NOTE — PROGRESS NOTE ADULT - ASSESSMENT
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  Patient has been experiencing shortness of breath and spoke to Dr. Padron at a recent visit.  Dr. Padron sent him in for CTA outpatient  which showed PE, and sent him to the ED for further management. patient admitted to SDU     #Extensive bilateral acute lobar pulmonary emboli   #CT evidence of R heart strain  #Hx of unprovoked DVT/PE 1-1/2 years ago s/p Eliquis  - CTA chest: Acute lobar pulmonary emboli involving every lobe branch, most pronounced involving right lower/middle lobes and left upper lobe. RV/LV ratio is 1.52, indicative of right heart strain  - LE duplex: DVT in the LEFT femoral, femoral and popliteal veins and gastrocnemius vein  - Currently feels well, saturating 92-94% on RA  - s/p Thrombectomy 11/12 with IR. Sheath removed by IR. No sign of bleeding  - S/p Heparin drip. Now on Eliquis. Daniel CM re: eliquis pricing. Will need lifelong AC   - Check ambulatory pulse ox  - Heme onc eval as OP for hypercoagulable work up    #Essential Hypertension  #Hyperlipidemia  - C/w home medications     #Depression  - C/w Vilazodone    #BPH   - c/w tamsulosin, monitor UO    DVT ppx: Eliquis  Diet: Dash    Pending: DC

## 2024-11-14 NOTE — DISCHARGE NOTE NURSING/CASE MANAGEMENT/SOCIAL WORK - NSDCPEFALRISK_GEN_ALL_CORE
For information on Fall & Injury Prevention, visit: [email or web address] OR  [email or web address] OR  https://www.cdc.gov/steadi/patient.html

## 2024-11-14 NOTE — PROGRESS NOTE ADULT - SUBJECTIVE AND OBJECTIVE BOX
SUBJECTIVE/OVERNIGHT EVENTS  Today is hospital day 3d. This morning patient was seen and examined at bedside, resting comfortably in bed. No acute or major events overnight.    MEDICATIONS  STANDING MEDICATIONS  amLODIPine   Tablet 5 milliGRAM(s) Oral at bedtime  apixaban 10 milliGRAM(s) Oral every 12 hours  chlorhexidine 2% Cloths 1 Application(s) Topical daily  finasteride 5 milliGRAM(s) Oral daily  influenza  Vaccine (HIGH DOSE) 0.5 milliLiter(s) IntraMuscular once  losartan 100 milliGRAM(s) Oral daily  rosuvastatin 10 milliGRAM(s) Oral daily  tamsulosin 0.4 milliGRAM(s) Oral daily    PRN MEDICATIONS  acetaminophen     Tablet .. 650 milliGRAM(s) Oral every 6 hours PRN    VITALS  T(F): 97.5 (11-14-24 @ 05:29), Max: 98.8 (11-13-24 @ 19:55)  HR: 66 (11-14-24 @ 05:29) (66 - 92)  BP: 134/86 (11-14-24 @ 05:29) (111/77 - 134/86)  RR: 18 (11-14-24 @ 05:29) (18 - 18)  SpO2: 97% (11-13-24 @ 19:55) (93% - 97%)    PHYSICAL EXAM  GENERAL  ( X ) NAD, lying in bed comfortably     (  ) obtunded     (  ) lethargic     (  ) somnolent    HEAD  (  ) Atraumatic     (  ) hematoma     (  ) laceration (specify location:       )     NECK  (  ) Supple     (  ) neck stiffness     (  ) nuchal rigidity     (  )  no JVD     (  ) JVD present ( -- cm)    HEART  Rate -->  ( X ) normal rate    (  ) bradycardic    (  ) tachycardic  Rhythm -->  ( X ) regular    (  ) regularly irregular    (  ) irregularly irregular  Murmurs -->  (  ) normal s1/s2    (  ) systolic murmur    (  ) diastolic murmur    (  ) continuous murmur     (  ) S3 present    (  ) S4 present    LUNGS  ( X ) Unlabored respirations     (  ) tachypnea  ( X ) B/L air entry     (  ) decreased breath sounds in:  (location     )    ( X ) no adventitious sound     (  ) crackles     (  ) wheezing      (  ) rhonchi      (specify location:       )  (  ) chest wall tenderness (specify location:       )    ABDOMEN  (  ) Soft     (  ) tense   |   (  ) nondistended     (  ) distended   |   (  ) +BS     (  ) hypoactive bowel sounds     (  ) hyperactive bowel sounds  (  ) nontender     (  ) RUQ tenderness     (  ) RLQ tenderness     (  ) LLQ tenderness     (  ) epigastric tenderness     (  ) diffuse tenderness  (  ) Splenomegaly      (  ) Hepatomegaly      (  ) Jaundice     (  ) ecchymosis     EXTREMITIES  (  ) Normal     (  ) Rash     (  ) ecchymosis     (  ) varicose veins      (  ) pitting edema     (  ) non-pitting edema   (  ) ulceration     (  ) gangrene:     (location:     )    NERVOUS SYSTEM  ( X ) A&Ox3     (  ) confused     (  ) lethargic  CN II-XII:     (  ) Intact     (  ) focal deficits  (Specify:     )   Upper extremities:     (  ) strength X/5     (  ) focal deficit (specify:    )  Lower extremities:     (  ) strength  X/5    (  ) focal deficit (specify:    )    SKIN  (  ) No rashes or lesions     (  ) maculopapular rash     (  ) pustules     (  ) vesicles     (  ) ulcer     (  ) ecchymosis     (specify location:     )    (  ) Indwelling Mora Catheter   Date insterted:    Reason (  ) Critical illness     (  ) urinary retention    (  ) Accurate Ins/Outs Monitoring     (  ) CMO patient      LABS             15.9   7.99  )-----------( 167      ( 11-14-24 @ 07:06 )             47.5     140  |  106  |  19  -------------------------<  85   11-13-24 @ 04:50  4.1  |  25  |  1.1    Ca      9.4     11-13-24 @ 04:50  Phos   4.0     11-13-24 @ 04:50  Mg     1.9     11-13-24 @ 04:50    TPro  6.1  /  Alb  3.9  /  TBili  0.9  /  DBili  x   /  AST  24  /  ALT  25  /  AlkPhos  74  /  GGT  x     11-13-24 @ 04:50    PTT - ( 11-13-24 @ 11:05 )  PTT:78.3 sec    Troponin T, High Sensitivity Result: 46 ng/L (11-13-24 @ 11:01)  Pro-Brain Natriuretic Peptide: 173 pg/mL (11-11-24 @ 15:10)  Troponin T, High Sensitivity Result: 111 ng/L (11-11-24 @ 15:10)    Urinalysis Basic - ( 13 Nov 2024 04:50 )    Color: x / Appearance: x / SG: x / pH: x  Gluc: 85 mg/dL / Ketone: x  / Bili: x / Urobili: x   Blood: x / Protein: x / Nitrite: x   Leuk Esterase: x / RBC: x / WBC x   Sq Epi: x / Non Sq Epi: x / Bacteria: x          IMAGING

## 2024-11-22 DIAGNOSIS — Z80.42 FAMILY HISTORY OF MALIGNANT NEOPLASM OF PROSTATE: ICD-10-CM

## 2024-11-22 DIAGNOSIS — Z79.01 LONG TERM (CURRENT) USE OF ANTICOAGULANTS: ICD-10-CM

## 2024-11-22 DIAGNOSIS — Z86.718 PERSONAL HISTORY OF OTHER VENOUS THROMBOSIS AND EMBOLISM: ICD-10-CM

## 2024-11-22 DIAGNOSIS — I10 ESSENTIAL (PRIMARY) HYPERTENSION: ICD-10-CM

## 2024-11-22 DIAGNOSIS — I82.412 ACUTE EMBOLISM AND THROMBOSIS OF LEFT FEMORAL VEIN: ICD-10-CM

## 2024-11-22 DIAGNOSIS — I82.462 ACUTE EMBOLISM AND THROMBOSIS OF LEFT CALF MUSCULAR VEIN: ICD-10-CM

## 2024-11-22 DIAGNOSIS — I82.432 ACUTE EMBOLISM AND THROMBOSIS OF LEFT POPLITEAL VEIN: ICD-10-CM

## 2024-11-22 DIAGNOSIS — E78.00 PURE HYPERCHOLESTEROLEMIA, UNSPECIFIED: ICD-10-CM

## 2024-11-22 DIAGNOSIS — N40.0 BENIGN PROSTATIC HYPERPLASIA WITHOUT LOWER URINARY TRACT SYMPTOMS: ICD-10-CM

## 2024-11-22 DIAGNOSIS — I26.09 OTHER PULMONARY EMBOLISM WITH ACUTE COR PULMONALE: ICD-10-CM

## 2024-11-22 DIAGNOSIS — F32.A DEPRESSION, UNSPECIFIED: ICD-10-CM

## 2024-11-22 DIAGNOSIS — Z87.442 PERSONAL HISTORY OF URINARY CALCULI: ICD-10-CM

## 2024-12-17 ENCOUNTER — APPOINTMENT (OUTPATIENT)
Dept: ORTHOPEDIC SURGERY | Facility: CLINIC | Age: 79
End: 2024-12-17
Payer: MEDICARE

## 2024-12-17 DIAGNOSIS — M17.11 UNILATERAL PRIMARY OSTEOARTHRITIS, RIGHT KNEE: ICD-10-CM

## 2024-12-17 PROCEDURE — 99213 OFFICE O/P EST LOW 20 MIN: CPT

## 2024-12-23 ENCOUNTER — APPOINTMENT (OUTPATIENT)
Dept: CARDIOLOGY | Facility: CLINIC | Age: 79
End: 2024-12-23

## 2024-12-26 ENCOUNTER — APPOINTMENT (OUTPATIENT)
Dept: PULMONOLOGY | Facility: CLINIC | Age: 79
End: 2024-12-26

## 2025-01-10 ENCOUNTER — APPOINTMENT (OUTPATIENT)
Dept: PULMONOLOGY | Facility: CLINIC | Age: 80
End: 2025-01-10

## 2025-01-17 ENCOUNTER — LABORATORY RESULT (OUTPATIENT)
Age: 80
End: 2025-01-17

## 2025-01-17 ENCOUNTER — OUTPATIENT (OUTPATIENT)
Dept: OUTPATIENT SERVICES | Facility: HOSPITAL | Age: 80
LOS: 1 days | End: 2025-01-17
Payer: MEDICARE

## 2025-01-17 ENCOUNTER — APPOINTMENT (OUTPATIENT)
Age: 80
End: 2025-01-17
Payer: MEDICARE

## 2025-01-17 VITALS
HEIGHT: 72 IN | TEMPERATURE: 97.8 F | WEIGHT: 211.38 LBS | HEART RATE: 94 BPM | BODY MASS INDEX: 28.63 KG/M2 | DIASTOLIC BLOOD PRESSURE: 83 MMHG | SYSTOLIC BLOOD PRESSURE: 137 MMHG | OXYGEN SATURATION: 98 %

## 2025-01-17 DIAGNOSIS — Z98.890 OTHER SPECIFIED POSTPROCEDURAL STATES: Chronic | ICD-10-CM

## 2025-01-17 DIAGNOSIS — D68.9 COAGULATION DEFECT, UNSPECIFIED: ICD-10-CM

## 2025-01-17 PROCEDURE — G0452: CPT | Mod: 26

## 2025-01-17 PROCEDURE — 85027 COMPLETE CBC AUTOMATED: CPT

## 2025-01-17 PROCEDURE — 86146 BETA-2 GLYCOPROTEIN ANTIBODY: CPT

## 2025-01-17 PROCEDURE — 99204 OFFICE O/P NEW MOD 45 MIN: CPT

## 2025-01-17 PROCEDURE — 80053 COMPREHEN METABOLIC PANEL: CPT

## 2025-01-17 PROCEDURE — 81270 JAK2 GENE: CPT

## 2025-01-17 PROCEDURE — 81241 F5 GENE: CPT

## 2025-01-17 PROCEDURE — 86147 CARDIOLIPIN ANTIBODY EA IG: CPT

## 2025-01-17 PROCEDURE — 81240 F2 GENE: CPT

## 2025-01-17 PROCEDURE — 85307 ASSAY ACTIVATED PROTEIN C: CPT

## 2025-01-17 RX ORDER — APIXABAN 5 MG/1
5 TABLET, FILM COATED ORAL
Refills: 0 | Status: ACTIVE | COMMUNITY

## 2025-01-17 RX ORDER — CELECOXIB 200 MG/1
200 CAPSULE ORAL
Refills: 0 | Status: ACTIVE | COMMUNITY

## 2025-01-17 RX ORDER — MULTIVITAMIN/IRON/FOLIC ACID 18MG-0.4MG
TABLET ORAL
Refills: 0 | Status: ACTIVE | COMMUNITY

## 2025-01-17 RX ORDER — UBIDECARENONE 100 MG
100 CAPSULE ORAL
Refills: 0 | Status: ACTIVE | COMMUNITY

## 2025-01-17 RX ORDER — OLMESARTAN MEDOXOMIL 40 MG/1
40 TABLET, FILM COATED ORAL
Refills: 0 | Status: ACTIVE | COMMUNITY

## 2025-01-18 DIAGNOSIS — D68.9 COAGULATION DEFECT, UNSPECIFIED: ICD-10-CM

## 2025-01-18 LAB
ALBUMIN SERPL ELPH-MCNC: 4 G/DL
ALP BLD-CCNC: 80 U/L
ALT SERPL-CCNC: 24 U/L
ANION GAP SERPL CALC-SCNC: 14 MMOL/L
AST SERPL-CCNC: 27 U/L
BILIRUB SERPL-MCNC: 0.4 MG/DL
BUN SERPL-MCNC: 23 MG/DL
CALCIUM SERPL-MCNC: 9.6 MG/DL
CHLORIDE SERPL-SCNC: 107 MMOL/L
CO2 SERPL-SCNC: 23 MMOL/L
CREAT SERPL-MCNC: 1.1 MG/DL
EGFR: 69 ML/MIN/1.73M2
GLUCOSE SERPL-MCNC: 92 MG/DL
HCT VFR BLD CALC: 46.9 %
HGB BLD-MCNC: 15.7 G/DL
MCHC RBC-ENTMCNC: 32.4 PG
MCHC RBC-ENTMCNC: 33.5 G/DL
MCV RBC AUTO: 96.7 FL
PLATELET # BLD AUTO: 193 K/UL
PMV BLD: 10 FL
POTASSIUM SERPL-SCNC: 4.4 MMOL/L
PROT SERPL-MCNC: 6.3 G/DL
RBC # BLD: 4.85 M/UL
RBC # FLD: 13.9 %
SODIUM SERPL-SCNC: 144 MMOL/L
WBC # FLD AUTO: 9.07 K/UL

## 2025-01-18 RX ORDER — APIXABAN 5 MG/1
5 TABLET, FILM COATED ORAL
Refills: 0 | Status: ACTIVE | COMMUNITY

## 2025-01-20 LAB
B2 GLYCOPROT1 IGA SERPL IA-ACNC: <2 U/ML
B2 GLYCOPROT1 IGG SER-ACNC: <1.4 U/ML
B2 GLYCOPROT1 IGM SER-ACNC: <1.5 U/ML
CARDIOLIPIN IGM SER-MCNC: 1.8 MPL U/ML
CARDIOLIPIN IGM SER-MCNC: <1.6 GPL U/ML

## 2025-01-21 LAB
APCR PPP: 2.43 RATIO
FVL BLANK: 34.1
FVL TEST: 82.7

## 2025-01-24 LAB
DNA PLOIDY SPEC FC-IMP: NORMAL
PTR INTERP: NORMAL

## 2025-01-25 LAB
JAK2 P.V617F BLD/T QL: NORMAL
REFLEX:: NORMAL

## 2025-02-18 ENCOUNTER — APPOINTMENT (OUTPATIENT)
Dept: ORTHOPEDIC SURGERY | Facility: CLINIC | Age: 80
End: 2025-02-18
Payer: MEDICARE

## 2025-02-18 DIAGNOSIS — M17.0 BILATERAL PRIMARY OSTEOARTHRITIS OF KNEE: ICD-10-CM

## 2025-02-18 PROCEDURE — 99213 OFFICE O/P EST LOW 20 MIN: CPT

## 2025-02-18 RX ADMIN — Medication 1 MG/6ML: at 00:00

## 2025-04-07 NOTE — ASSESSMENT
[FreeTextEntry1] : Probable epidermal inclusion cyst of the right pretibial region. A dermatofibroma is also possible but the lesion appears benign. This can be excised under local anesthesia with IV sedation and the procedure was discussed in full.  He understands that the skin closure in this area may be somewhat tight and cause some temporary distal edema. All his questions answered and he understands and agrees. He will contact the office for scheduling.
44.4

## 2025-04-08 ENCOUNTER — APPOINTMENT (OUTPATIENT)
Dept: ORTHOPEDIC SURGERY | Facility: CLINIC | Age: 80
End: 2025-04-08
Payer: MEDICARE

## 2025-04-08 DIAGNOSIS — M17.0 BILATERAL PRIMARY OSTEOARTHRITIS OF KNEE: ICD-10-CM

## 2025-04-08 PROCEDURE — 99214 OFFICE O/P EST MOD 30 MIN: CPT

## 2025-04-08 PROCEDURE — 72110 X-RAY EXAM L-2 SPINE 4/>VWS: CPT

## 2025-04-08 PROCEDURE — 73502 X-RAY EXAM HIP UNI 2-3 VIEWS: CPT

## 2025-05-09 ENCOUNTER — APPOINTMENT (OUTPATIENT)
Dept: ORTHOPEDIC SURGERY | Facility: CLINIC | Age: 80
End: 2025-05-09
Payer: MEDICARE

## 2025-05-09 PROCEDURE — 72110 X-RAY EXAM L-2 SPINE 4/>VWS: CPT

## 2025-05-09 PROCEDURE — 99213 OFFICE O/P EST LOW 20 MIN: CPT

## 2025-05-12 ENCOUNTER — APPOINTMENT (OUTPATIENT)
Dept: MRI IMAGING | Facility: CLINIC | Age: 80
End: 2025-05-12
Payer: MEDICARE

## 2025-05-12 PROCEDURE — 72148 MRI LUMBAR SPINE W/O DYE: CPT

## 2025-05-16 ENCOUNTER — APPOINTMENT (OUTPATIENT)
Dept: ORTHOPEDIC SURGERY | Facility: CLINIC | Age: 80
End: 2025-05-16
Payer: MEDICARE

## 2025-05-16 ENCOUNTER — APPOINTMENT (OUTPATIENT)
Dept: PAIN MANAGEMENT | Facility: CLINIC | Age: 80
End: 2025-05-16
Payer: MEDICARE

## 2025-05-16 DIAGNOSIS — M51.26 OTHER INTERVERTEBRAL DISC DISPLACEMENT, LUMBAR REGION: ICD-10-CM

## 2025-05-16 DIAGNOSIS — M54.16 RADICULOPATHY, LUMBAR REGION: ICD-10-CM

## 2025-05-16 PROCEDURE — 99214 OFFICE O/P EST MOD 30 MIN: CPT

## 2025-05-16 PROCEDURE — 99204 OFFICE O/P NEW MOD 45 MIN: CPT

## 2025-05-19 PROBLEM — M51.26 HERNIATION OF LUMBAR INTERVERTEBRAL DISC: Status: ACTIVE | Noted: 2025-05-19

## 2025-06-02 ENCOUNTER — APPOINTMENT (OUTPATIENT)
Dept: PAIN MANAGEMENT | Facility: CLINIC | Age: 80
End: 2025-06-02
Payer: MEDICARE

## 2025-06-02 DIAGNOSIS — M54.16 RADICULOPATHY, LUMBAR REGION: ICD-10-CM

## 2025-06-02 PROCEDURE — 64484 NJX AA&/STRD TFRM EPI L/S EA: CPT | Mod: RT

## 2025-06-02 PROCEDURE — 64483 NJX AA&/STRD TFRM EPI L/S 1: CPT | Mod: RT

## 2025-06-12 ENCOUNTER — APPOINTMENT (OUTPATIENT)
Dept: VASCULAR SURGERY | Facility: CLINIC | Age: 80
End: 2025-06-12

## 2025-06-18 ENCOUNTER — APPOINTMENT (OUTPATIENT)
Dept: PAIN MANAGEMENT | Facility: CLINIC | Age: 80
End: 2025-06-18
Payer: MEDICARE

## 2025-06-18 PROCEDURE — 99214 OFFICE O/P EST MOD 30 MIN: CPT

## 2025-06-18 RX ORDER — PREGABALIN 50 MG/1
50 CAPSULE ORAL
Qty: 60 | Refills: 2 | Status: ACTIVE | COMMUNITY
Start: 2025-06-18 | End: 1900-01-01

## 2025-06-18 RX ORDER — MELOXICAM 15 MG/1
15 TABLET ORAL
Qty: 30 | Refills: 2 | Status: ACTIVE | COMMUNITY
Start: 2025-06-18 | End: 1900-01-01

## 2025-06-30 ENCOUNTER — APPOINTMENT (OUTPATIENT)
Dept: PAIN MANAGEMENT | Facility: CLINIC | Age: 80
End: 2025-06-30
Payer: MEDICARE

## 2025-06-30 PROCEDURE — 64493 INJ PARAVERT F JNT L/S 1 LEV: CPT | Mod: 50,59

## 2025-06-30 PROCEDURE — 64494 INJ PARAVERT F JNT L/S 2 LEV: CPT | Mod: 50

## 2025-07-16 ENCOUNTER — APPOINTMENT (OUTPATIENT)
Dept: PAIN MANAGEMENT | Facility: CLINIC | Age: 80
End: 2025-07-16
Payer: MEDICARE

## 2025-07-16 PROBLEM — M47.816 FACET ARTHRITIS OF LUMBAR REGION: Status: ACTIVE | Noted: 2025-06-18

## 2025-07-16 PROCEDURE — 99214 OFFICE O/P EST MOD 30 MIN: CPT

## 2025-07-16 RX ORDER — PREGABALIN 50 MG/1
50 CAPSULE ORAL
Qty: 60 | Refills: 0 | Status: ACTIVE | COMMUNITY
Start: 2025-07-16 | End: 1900-01-01

## 2025-08-07 ENCOUNTER — APPOINTMENT (OUTPATIENT)
Dept: ORTHOPEDIC SURGERY | Facility: CLINIC | Age: 80
End: 2025-08-07

## 2025-08-13 ENCOUNTER — APPOINTMENT (OUTPATIENT)
Dept: PAIN MANAGEMENT | Facility: CLINIC | Age: 80
End: 2025-08-13

## 2025-08-13 ENCOUNTER — APPOINTMENT (OUTPATIENT)
Dept: NEUROLOGY | Facility: CLINIC | Age: 80
End: 2025-08-13

## 2025-09-08 ENCOUNTER — APPOINTMENT (OUTPATIENT)
Dept: NEUROSURGERY | Facility: HOSPITAL | Age: 80
End: 2025-09-08

## 2025-09-08 ENCOUNTER — TRANSCRIPTION ENCOUNTER (OUTPATIENT)
Age: 80
End: 2025-09-08

## 2025-09-18 ENCOUNTER — TRANSCRIPTION ENCOUNTER (OUTPATIENT)
Age: 80
End: 2025-09-18

## 2025-09-24 PROBLEM — I82.90 VTE (VENOUS THROMBOEMBOLISM): Status: ACTIVE | Noted: 2025-09-24
